# Patient Record
Sex: MALE | Race: WHITE | NOT HISPANIC OR LATINO | Employment: OTHER | URBAN - METROPOLITAN AREA
[De-identification: names, ages, dates, MRNs, and addresses within clinical notes are randomized per-mention and may not be internally consistent; named-entity substitution may affect disease eponyms.]

---

## 2023-09-28 LAB — KEPPRA: 14 UG/ML (ref 10–40)

## 2024-01-01 ENCOUNTER — ANESTHESIA EVENT (OUTPATIENT)
Dept: OPERATING ROOM | Facility: HOSPITAL | Age: 69
End: 2024-01-01
Payer: MEDICARE

## 2024-01-01 ENCOUNTER — HOSPITAL ENCOUNTER (INPATIENT)
Facility: HOSPITAL | Age: 69
LOS: 2 days | Discharge: HOSPICE | End: 2024-11-30
Attending: STUDENT IN AN ORGANIZED HEALTH CARE EDUCATION/TRAINING PROGRAM | Admitting: SURGERY
Payer: MEDICARE

## 2024-01-01 ENCOUNTER — HOSPITAL ENCOUNTER (EMERGENCY)
Facility: HOSPITAL | Age: 69
Discharge: ANOTHER HOSPITAL | End: 2024-11-28
Attending: EMERGENCY MEDICINE
Payer: OTHER GOVERNMENT

## 2024-01-01 ENCOUNTER — ANESTHESIA (OUTPATIENT)
Dept: OPERATING ROOM | Facility: HOSPITAL | Age: 69
End: 2024-01-01
Payer: MEDICARE

## 2024-01-01 ENCOUNTER — APPOINTMENT (OUTPATIENT)
Dept: RADIOLOGY | Facility: HOSPITAL | Age: 69
End: 2024-01-01
Payer: OTHER GOVERNMENT

## 2024-01-01 ENCOUNTER — HOSPITAL ENCOUNTER (INPATIENT)
Facility: HOSPITAL | Age: 69
LOS: 1 days | End: 2024-11-30
Attending: SURGERY | Admitting: SURGERY
Payer: MEDICARE

## 2024-01-01 ENCOUNTER — APPOINTMENT (OUTPATIENT)
Dept: CARDIOLOGY | Facility: HOSPITAL | Age: 69
End: 2024-01-01
Payer: OTHER GOVERNMENT

## 2024-01-01 VITALS
OXYGEN SATURATION: 88 % | HEART RATE: 115 BPM | DIASTOLIC BLOOD PRESSURE: 62 MMHG | TEMPERATURE: 97 F | SYSTOLIC BLOOD PRESSURE: 85 MMHG | RESPIRATION RATE: 8 BRPM

## 2024-01-01 VITALS
BODY MASS INDEX: 19.64 KG/M2 | SYSTOLIC BLOOD PRESSURE: 178 MMHG | RESPIRATION RATE: 16 BRPM | TEMPERATURE: 98.6 F | DIASTOLIC BLOOD PRESSURE: 108 MMHG | HEART RATE: 96 BPM | WEIGHT: 145 LBS | OXYGEN SATURATION: 96 % | HEIGHT: 72 IN

## 2024-01-01 DIAGNOSIS — A41.9 SEPSIS WITH ACUTE ORGAN DYSFUNCTION WITHOUT SEPTIC SHOCK, DUE TO UNSPECIFIED ORGANISM, UNSPECIFIED ORGAN DYSFUNCTION TYPE (MULTI): ICD-10-CM

## 2024-01-01 DIAGNOSIS — R65.20 SEPSIS WITH ACUTE ORGAN DYSFUNCTION WITHOUT SEPTIC SHOCK, DUE TO UNSPECIFIED ORGANISM, UNSPECIFIED ORGAN DYSFUNCTION TYPE (MULTI): ICD-10-CM

## 2024-01-01 DIAGNOSIS — K55.019: ICD-10-CM

## 2024-01-01 DIAGNOSIS — K55.019: Primary | ICD-10-CM

## 2024-01-01 DIAGNOSIS — K55.9 MESENTERIC ISCHEMIA (MULTI): Primary | ICD-10-CM

## 2024-01-01 DIAGNOSIS — K55.9 MESENTERIC ISCHEMIA (MULTI): ICD-10-CM

## 2024-01-01 LAB
ABO GROUP (TYPE) IN BLOOD: NORMAL
ABO GROUP (TYPE) IN BLOOD: NORMAL
ALBUMIN SERPL BCP-MCNC: 5.2 G/DL (ref 3.4–5)
ALP SERPL-CCNC: 53 U/L (ref 33–136)
ALT SERPL W P-5'-P-CCNC: 24 U/L (ref 10–52)
ANION GAP SERPL CALC-SCNC: 18 MMOL/L (ref 10–20)
AST SERPL W P-5'-P-CCNC: 46 U/L (ref 9–39)
BASOPHILS # BLD AUTO: 0.03 X10*3/UL (ref 0–0.1)
BASOPHILS NFR BLD AUTO: 0.2 %
BILIRUB SERPL-MCNC: 1.4 MG/DL (ref 0–1.2)
BUN SERPL-MCNC: 17 MG/DL (ref 6–23)
CALCIUM SERPL-MCNC: 10.2 MG/DL (ref 8.6–10.3)
CARDIAC TROPONIN I PNL SERPL HS: 13 NG/L (ref 0–20)
CHLORIDE SERPL-SCNC: 95 MMOL/L (ref 98–107)
CO2 SERPL-SCNC: 31 MMOL/L (ref 21–32)
CREAT SERPL-MCNC: 0.93 MG/DL (ref 0.5–1.3)
EGFRCR SERPLBLD CKD-EPI 2021: 89 ML/MIN/1.73M*2
EOSINOPHIL # BLD AUTO: 0.12 X10*3/UL (ref 0–0.7)
EOSINOPHIL NFR BLD AUTO: 0.6 %
ERYTHROCYTE [DISTWIDTH] IN BLOOD BY AUTOMATED COUNT: 13.2 % (ref 11.5–14.5)
GLUCOSE SERPL-MCNC: 156 MG/DL (ref 74–99)
HCT VFR BLD AUTO: 41.9 % (ref 41–52)
HGB BLD-MCNC: 14.7 G/DL (ref 13.5–17.5)
IMM GRANULOCYTES # BLD AUTO: 0.12 X10*3/UL (ref 0–0.7)
IMM GRANULOCYTES NFR BLD AUTO: 0.6 % (ref 0–0.9)
LACTATE SERPL-SCNC: 1.6 MMOL/L (ref 0.4–2)
LACTATE SERPL-SCNC: 2.6 MMOL/L (ref 0.4–2)
LIPASE SERPL-CCNC: 13 U/L (ref 9–82)
LYMPHOCYTES # BLD AUTO: 0.52 X10*3/UL (ref 1.2–4.8)
LYMPHOCYTES NFR BLD AUTO: 2.7 %
MCH RBC QN AUTO: 32.8 PG (ref 26–34)
MCHC RBC AUTO-ENTMCNC: 35.1 G/DL (ref 32–36)
MCV RBC AUTO: 94 FL (ref 80–100)
MONOCYTES # BLD AUTO: 1.69 X10*3/UL (ref 0.1–1)
MONOCYTES NFR BLD AUTO: 8.7 %
NEUTROPHILS # BLD AUTO: 16.91 X10*3/UL (ref 1.2–7.7)
NEUTROPHILS NFR BLD AUTO: 87.2 %
NRBC BLD-RTO: 0 /100 WBCS (ref 0–0)
PLATELET # BLD AUTO: 237 X10*3/UL (ref 150–450)
POTASSIUM SERPL-SCNC: 3.7 MMOL/L (ref 3.5–5.3)
PROT SERPL-MCNC: 8.4 G/DL (ref 6.4–8.2)
RBC # BLD AUTO: 4.48 X10*6/UL (ref 4.5–5.9)
RBC MORPH BLD: NORMAL
RH FACTOR (ANTIGEN D): NORMAL
RH FACTOR (ANTIGEN D): NORMAL
SODIUM SERPL-SCNC: 140 MMOL/L (ref 136–145)
WBC # BLD AUTO: 19.4 X10*3/UL (ref 4.4–11.3)

## 2024-01-01 PROCEDURE — 2500000004 HC RX 250 GENERAL PHARMACY W/ HCPCS (ALT 636 FOR OP/ED)

## 2024-01-01 PROCEDURE — 7100000002 HC RECOVERY ROOM TIME - EACH INCREMENTAL 1 MINUTE: Performed by: STUDENT IN AN ORGANIZED HEALTH CARE EDUCATION/TRAINING PROGRAM

## 2024-01-01 PROCEDURE — 7100000001 HC RECOVERY ROOM TIME - INITIAL BASE CHARGE: Performed by: STUDENT IN AN ORGANIZED HEALTH CARE EDUCATION/TRAINING PROGRAM

## 2024-01-01 PROCEDURE — 86901 BLOOD TYPING SEROLOGIC RH(D): CPT

## 2024-01-01 PROCEDURE — 2500000005 HC RX 250 GENERAL PHARMACY W/O HCPCS: Performed by: SURGERY

## 2024-01-01 PROCEDURE — 99223 1ST HOSP IP/OBS HIGH 75: CPT

## 2024-01-01 PROCEDURE — 2500000004 HC RX 250 GENERAL PHARMACY W/ HCPCS (ALT 636 FOR OP/ED): Performed by: EMERGENCY MEDICINE

## 2024-01-01 PROCEDURE — 49000 EXPLORATION OF ABDOMEN: CPT | Performed by: SURGERY

## 2024-01-01 PROCEDURE — 99223 1ST HOSP IP/OBS HIGH 75: CPT | Performed by: SURGERY

## 2024-01-01 PROCEDURE — 83690 ASSAY OF LIPASE: CPT | Performed by: EMERGENCY MEDICINE

## 2024-01-01 PROCEDURE — 3600000003 HC OR TIME - INITIAL BASE CHARGE - PROCEDURE LEVEL THREE: Performed by: STUDENT IN AN ORGANIZED HEALTH CARE EDUCATION/TRAINING PROGRAM

## 2024-01-01 PROCEDURE — 36415 COLL VENOUS BLD VENIPUNCTURE: CPT | Performed by: STUDENT IN AN ORGANIZED HEALTH CARE EDUCATION/TRAINING PROGRAM

## 2024-01-01 PROCEDURE — 2720000007 HC OR 272 NO HCPCS: Performed by: STUDENT IN AN ORGANIZED HEALTH CARE EDUCATION/TRAINING PROGRAM

## 2024-01-01 PROCEDURE — 96375 TX/PRO/DX INJ NEW DRUG ADDON: CPT | Mod: 59

## 2024-01-01 PROCEDURE — 99497 ADVNCD CARE PLAN 30 MIN: CPT

## 2024-01-01 PROCEDURE — 36600 WITHDRAWAL OF ARTERIAL BLOOD: CPT

## 2024-01-01 PROCEDURE — 99285 EMERGENCY DEPT VISIT HI MDM: CPT | Mod: 25

## 2024-01-01 PROCEDURE — 1150000001 HC HOSPICE PRIVATE ROOM DAILY

## 2024-01-01 PROCEDURE — 3700000002 HC GENERAL ANESTHESIA TIME - EACH INCREMENTAL 1 MINUTE: Performed by: STUDENT IN AN ORGANIZED HEALTH CARE EDUCATION/TRAINING PROGRAM

## 2024-01-01 PROCEDURE — 2500000004 HC RX 250 GENERAL PHARMACY W/ HCPCS (ALT 636 FOR OP/ED): Performed by: STUDENT IN AN ORGANIZED HEALTH CARE EDUCATION/TRAINING PROGRAM

## 2024-01-01 PROCEDURE — 36415 COLL VENOUS BLD VENIPUNCTURE: CPT | Performed by: EMERGENCY MEDICINE

## 2024-01-01 PROCEDURE — 83605 ASSAY OF LACTIC ACID: CPT | Performed by: STUDENT IN AN ORGANIZED HEALTH CARE EDUCATION/TRAINING PROGRAM

## 2024-01-01 PROCEDURE — 0DJD0ZZ INSPECTION OF LOWER INTESTINAL TRACT, OPEN APPROACH: ICD-10-PCS | Performed by: STUDENT IN AN ORGANIZED HEALTH CARE EDUCATION/TRAINING PROGRAM

## 2024-01-01 PROCEDURE — 74174 CTA ABD&PLVS W/CONTRAST: CPT

## 2024-01-01 PROCEDURE — 1100000001 HC PRIVATE ROOM DAILY

## 2024-01-01 PROCEDURE — 96374 THER/PROPH/DIAG INJ IV PUSH: CPT | Mod: 59

## 2024-01-01 PROCEDURE — 74174 CTA ABD&PLVS W/CONTRAST: CPT | Performed by: STUDENT IN AN ORGANIZED HEALTH CARE EDUCATION/TRAINING PROGRAM

## 2024-01-01 PROCEDURE — 84075 ASSAY ALKALINE PHOSPHATASE: CPT | Performed by: EMERGENCY MEDICINE

## 2024-01-01 PROCEDURE — 93005 ELECTROCARDIOGRAM TRACING: CPT

## 2024-01-01 PROCEDURE — 83605 ASSAY OF LACTIC ACID: CPT | Performed by: EMERGENCY MEDICINE

## 2024-01-01 PROCEDURE — 3600000008 HC OR TIME - EACH INCREMENTAL 1 MINUTE - PROCEDURE LEVEL THREE: Performed by: STUDENT IN AN ORGANIZED HEALTH CARE EDUCATION/TRAINING PROGRAM

## 2024-01-01 PROCEDURE — 84484 ASSAY OF TROPONIN QUANT: CPT | Performed by: EMERGENCY MEDICINE

## 2024-01-01 PROCEDURE — 3700000001 HC GENERAL ANESTHESIA TIME - INITIAL BASE CHARGE: Performed by: STUDENT IN AN ORGANIZED HEALTH CARE EDUCATION/TRAINING PROGRAM

## 2024-01-01 PROCEDURE — 96361 HYDRATE IV INFUSION ADD-ON: CPT

## 2024-01-01 PROCEDURE — 85025 COMPLETE CBC W/AUTO DIFF WBC: CPT | Performed by: EMERGENCY MEDICINE

## 2024-01-01 PROCEDURE — 2550000001 HC RX 255 CONTRASTS: Performed by: STUDENT IN AN ORGANIZED HEALTH CARE EDUCATION/TRAINING PROGRAM

## 2024-01-01 PROCEDURE — 87040 BLOOD CULTURE FOR BACTERIA: CPT | Mod: GEALAB | Performed by: STUDENT IN AN ORGANIZED HEALTH CARE EDUCATION/TRAINING PROGRAM

## 2024-01-01 PROCEDURE — 99291 CRITICAL CARE FIRST HOUR: CPT | Performed by: STUDENT IN AN ORGANIZED HEALTH CARE EDUCATION/TRAINING PROGRAM

## 2024-01-01 RX ORDER — LIDOCAINE HYDROCHLORIDE 10 MG/ML
INJECTION, SOLUTION INFILTRATION; PERINEURAL AS NEEDED
Status: DISCONTINUED | OUTPATIENT
Start: 2024-01-01 | End: 2024-01-01

## 2024-01-01 RX ORDER — IBUPROFEN 200 MG
1 TABLET ORAL DAILY
Status: CANCELLED | OUTPATIENT
Start: 2024-12-01 | End: 2025-01-10

## 2024-01-01 RX ORDER — NICOTINE 7MG/24HR
1 PATCH, TRANSDERMAL 24 HOURS TRANSDERMAL DAILY
Status: DISCONTINUED | OUTPATIENT
Start: 2025-01-24 | End: 2024-01-01 | Stop reason: HOSPADM

## 2024-01-01 RX ORDER — ONDANSETRON HYDROCHLORIDE 2 MG/ML
4 INJECTION, SOLUTION INTRAVENOUS ONCE AS NEEDED
Status: DISCONTINUED | OUTPATIENT
Start: 2024-01-01 | End: 2024-01-01 | Stop reason: HOSPADM

## 2024-01-01 RX ORDER — LIDOCAINE HYDROCHLORIDE 10 MG/ML
0.1 INJECTION, SOLUTION INFILTRATION; PERINEURAL ONCE
Status: DISCONTINUED | OUTPATIENT
Start: 2024-01-01 | End: 2024-01-01 | Stop reason: HOSPADM

## 2024-01-01 RX ORDER — SODIUM CHLORIDE, SODIUM LACTATE, POTASSIUM CHLORIDE, CALCIUM CHLORIDE 600; 310; 30; 20 MG/100ML; MG/100ML; MG/100ML; MG/100ML
100 INJECTION, SOLUTION INTRAVENOUS CONTINUOUS
Status: DISCONTINUED | OUTPATIENT
Start: 2024-01-01 | End: 2024-01-01 | Stop reason: HOSPADM

## 2024-01-01 RX ORDER — IBUPROFEN 200 MG
1 TABLET ORAL DAILY
Status: DISCONTINUED | OUTPATIENT
Start: 2024-12-01 | End: 2024-01-01 | Stop reason: HOSPADM

## 2024-01-01 RX ORDER — HYDROMORPHONE HYDROCHLORIDE 0.2 MG/ML
0.2 INJECTION INTRAMUSCULAR; INTRAVENOUS; SUBCUTANEOUS EVERY 2 HOUR PRN
Status: DISCONTINUED | OUTPATIENT
Start: 2024-01-01 | End: 2024-01-01

## 2024-01-01 RX ORDER — HYDROMORPHONE HYDROCHLORIDE 0.2 MG/ML
0.2 INJECTION INTRAMUSCULAR; INTRAVENOUS; SUBCUTANEOUS
Status: CANCELLED | OUTPATIENT
Start: 2024-01-01

## 2024-01-01 RX ORDER — MORPHINE SULFATE IN 0.9 % NACL 30 MG/30ML
PATIENT CONTROLLED ANALGESIA SYRINGE INTRAVENOUS CONTINUOUS
Status: DISCONTINUED | OUTPATIENT
Start: 2024-01-01 | End: 2024-01-01

## 2024-01-01 RX ORDER — GLYCOPYRROLATE 0.2 MG/ML
0.2 INJECTION INTRAMUSCULAR; INTRAVENOUS EVERY 4 HOURS PRN
Status: DISCONTINUED | OUTPATIENT
Start: 2024-01-01 | End: 2024-01-01

## 2024-01-01 RX ORDER — ONDANSETRON HYDROCHLORIDE 2 MG/ML
4 INJECTION, SOLUTION INTRAVENOUS ONCE
Status: COMPLETED | OUTPATIENT
Start: 2024-01-01 | End: 2024-01-01

## 2024-01-01 RX ORDER — NICOTINE 7MG/24HR
1 PATCH, TRANSDERMAL 24 HOURS TRANSDERMAL DAILY
Status: CANCELLED | OUTPATIENT
Start: 2025-01-24 | End: 2025-02-07

## 2024-01-01 RX ORDER — HYDROMORPHONE HCL/0.9% NACL/PF 15 MG/30ML
PATIENT CONTROLLED ANALGESIA SYRINGE INTRAVENOUS CONTINUOUS
Status: DISCONTINUED | OUTPATIENT
Start: 2024-01-01 | End: 2024-01-01

## 2024-01-01 RX ORDER — NALOXONE HYDROCHLORIDE 0.4 MG/ML
0.2 INJECTION, SOLUTION INTRAMUSCULAR; INTRAVENOUS; SUBCUTANEOUS AS NEEDED
Status: DISCONTINUED | OUTPATIENT
Start: 2024-01-01 | End: 2024-01-01

## 2024-01-01 RX ORDER — IBUPROFEN 200 MG
1 TABLET ORAL DAILY
Status: DISCONTINUED | OUTPATIENT
Start: 2025-01-10 | End: 2024-01-01 | Stop reason: HOSPADM

## 2024-01-01 RX ORDER — FENTANYL CITRATE 50 UG/ML
INJECTION, SOLUTION INTRAMUSCULAR; INTRAVENOUS AS NEEDED
Status: DISCONTINUED | OUTPATIENT
Start: 2024-01-01 | End: 2024-01-01

## 2024-01-01 RX ORDER — HEPARIN SODIUM 10000 [USP'U]/100ML
0-4500 INJECTION, SOLUTION INTRAVENOUS CONTINUOUS
Status: DISCONTINUED | OUTPATIENT
Start: 2024-01-01 | End: 2024-01-01 | Stop reason: HOSPADM

## 2024-01-01 RX ORDER — DROPERIDOL 2.5 MG/ML
0.62 INJECTION, SOLUTION INTRAMUSCULAR; INTRAVENOUS ONCE AS NEEDED
Status: DISCONTINUED | OUTPATIENT
Start: 2024-01-01 | End: 2024-01-01 | Stop reason: HOSPADM

## 2024-01-01 RX ORDER — IBUPROFEN 200 MG
1 TABLET ORAL DAILY
Status: DISCONTINUED | OUTPATIENT
Start: 2024-01-01 | End: 2024-01-01 | Stop reason: HOSPADM

## 2024-01-01 RX ORDER — HYDROMORPHONE HYDROCHLORIDE 0.2 MG/ML
0.2 INJECTION INTRAMUSCULAR; INTRAVENOUS; SUBCUTANEOUS
Status: DISCONTINUED | OUTPATIENT
Start: 2024-01-01 | End: 2024-01-01 | Stop reason: HOSPADM

## 2024-01-01 RX ORDER — OXYCODONE HYDROCHLORIDE 5 MG/1
5 TABLET ORAL EVERY 4 HOURS PRN
Status: DISCONTINUED | OUTPATIENT
Start: 2024-01-01 | End: 2024-01-01 | Stop reason: HOSPADM

## 2024-01-01 RX ORDER — LORAZEPAM 2 MG/ML
1 INJECTION INTRAMUSCULAR EVERY 4 HOURS PRN
Status: DISCONTINUED | OUTPATIENT
Start: 2024-01-01 | End: 2024-01-01 | Stop reason: HOSPADM

## 2024-01-01 RX ORDER — ROCURONIUM BROMIDE 10 MG/ML
INJECTION, SOLUTION INTRAVENOUS AS NEEDED
Status: DISCONTINUED | OUTPATIENT
Start: 2024-01-01 | End: 2024-01-01

## 2024-01-01 RX ORDER — MIDAZOLAM HYDROCHLORIDE 1 MG/ML
INJECTION INTRAMUSCULAR; INTRAVENOUS AS NEEDED
Status: DISCONTINUED | OUTPATIENT
Start: 2024-01-01 | End: 2024-01-01

## 2024-01-01 RX ORDER — GLYCOPYRROLATE 0.2 MG/ML
0.4 INJECTION INTRAMUSCULAR; INTRAVENOUS EVERY 6 HOURS PRN
Status: DISCONTINUED | OUTPATIENT
Start: 2024-01-01 | End: 2024-01-01 | Stop reason: HOSPADM

## 2024-01-01 RX ORDER — MORPHINE SULFATE 4 MG/ML
4 INJECTION INTRAVENOUS ONCE
Status: COMPLETED | OUTPATIENT
Start: 2024-01-01 | End: 2024-01-01

## 2024-01-01 RX ORDER — HYDROMORPHONE HYDROCHLORIDE 1 MG/ML
0.2 INJECTION, SOLUTION INTRAMUSCULAR; INTRAVENOUS; SUBCUTANEOUS EVERY 4 HOURS PRN
Status: DISCONTINUED | OUTPATIENT
Start: 2024-01-01 | End: 2024-01-01 | Stop reason: HOSPADM

## 2024-01-01 RX ORDER — OXYCODONE HYDROCHLORIDE 5 MG/1
10 TABLET ORAL EVERY 4 HOURS PRN
Status: DISCONTINUED | OUTPATIENT
Start: 2024-01-01 | End: 2024-01-01 | Stop reason: HOSPADM

## 2024-01-01 RX ORDER — HYDROMORPHONE HCL/0.9% NACL/PF 15 MG/30ML
PATIENT CONTROLLED ANALGESIA SYRINGE INTRAVENOUS CONTINUOUS
Status: DISCONTINUED | OUTPATIENT
Start: 2024-01-01 | End: 2024-01-01 | Stop reason: HOSPADM

## 2024-01-01 RX ORDER — ONDANSETRON HYDROCHLORIDE 2 MG/ML
INJECTION, SOLUTION INTRAVENOUS AS NEEDED
Status: DISCONTINUED | OUTPATIENT
Start: 2024-01-01 | End: 2024-01-01

## 2024-01-01 RX ORDER — PROPOFOL 10 MG/ML
INJECTION, EMULSION INTRAVENOUS AS NEEDED
Status: DISCONTINUED | OUTPATIENT
Start: 2024-01-01 | End: 2024-01-01

## 2024-01-01 RX ORDER — HYDROMORPHONE HYDROCHLORIDE 0.2 MG/ML
0.2 INJECTION INTRAMUSCULAR; INTRAVENOUS; SUBCUTANEOUS EVERY 5 MIN PRN
Status: DISCONTINUED | OUTPATIENT
Start: 2024-01-01 | End: 2024-01-01 | Stop reason: HOSPADM

## 2024-01-01 RX ORDER — GLYCOPYRROLATE 0.2 MG/ML
0.4 INJECTION INTRAMUSCULAR; INTRAVENOUS EVERY 6 HOURS PRN
Status: CANCELLED | OUTPATIENT
Start: 2024-01-01

## 2024-01-01 RX ORDER — HYDROMORPHONE HCL/0.9% NACL/PF 15 MG/30ML
PATIENT CONTROLLED ANALGESIA SYRINGE INTRAVENOUS CONTINUOUS
Status: CANCELLED | OUTPATIENT
Start: 2024-01-01

## 2024-01-01 RX ORDER — IBUPROFEN 200 MG
1 TABLET ORAL DAILY
Status: CANCELLED | OUTPATIENT
Start: 2025-01-10 | End: 2025-01-24

## 2024-01-01 RX ORDER — LORAZEPAM 2 MG/ML
1 INJECTION INTRAMUSCULAR EVERY 4 HOURS PRN
Status: CANCELLED | OUTPATIENT
Start: 2024-01-01

## 2024-01-01 RX ORDER — SODIUM CHLORIDE 0.9 G/100ML
IRRIGANT IRRIGATION AS NEEDED
Status: DISCONTINUED | OUTPATIENT
Start: 2024-01-01 | End: 2024-01-01 | Stop reason: HOSPADM

## 2024-01-01 RX ORDER — LORAZEPAM 2 MG/ML
0.5 INJECTION INTRAMUSCULAR EVERY 4 HOURS PRN
Status: DISCONTINUED | OUTPATIENT
Start: 2024-01-01 | End: 2024-01-01

## 2024-01-01 SDOH — HEALTH STABILITY: MENTAL HEALTH: CURRENT SMOKER: 1

## 2024-01-01 ASSESSMENT — PAIN SCALES - GENERAL
PAINLEVEL_OUTOF10: 10 - WORST POSSIBLE PAIN
PAINLEVEL_OUTOF10: 0 - NO PAIN
PAINLEVEL_OUTOF10: 6
PAINLEVEL_OUTOF10: 7
PAINLEVEL_OUTOF10: 5 - MODERATE PAIN
PAINLEVEL_OUTOF10: 5 - MODERATE PAIN
PAINLEVEL_OUTOF10: 7
PAINLEVEL_OUTOF10: 8
PAINLEVEL_OUTOF10: 7
PAIN_LEVEL: 3
PAINLEVEL_OUTOF10: 5 - MODERATE PAIN

## 2024-01-01 ASSESSMENT — COGNITIVE AND FUNCTIONAL STATUS - GENERAL
WALKING IN HOSPITAL ROOM: TOTAL
MOVING TO AND FROM BED TO CHAIR: TOTAL
PERSONAL GROOMING: TOTAL
HELP NEEDED FOR BATHING: TOTAL
WALKING IN HOSPITAL ROOM: TOTAL
MOVING FROM LYING ON BACK TO SITTING ON SIDE OF FLAT BED WITH BEDRAILS: TOTAL
MOVING FROM LYING ON BACK TO SITTING ON SIDE OF FLAT BED WITH BEDRAILS: TOTAL
CLIMB 3 TO 5 STEPS WITH RAILING: TOTAL
CLIMB 3 TO 5 STEPS WITH RAILING: TOTAL
TURNING FROM BACK TO SIDE WHILE IN FLAT BAD: TOTAL
DRESSING REGULAR LOWER BODY CLOTHING: TOTAL
WALKING IN HOSPITAL ROOM: TOTAL
DAILY ACTIVITIY SCORE: 6
TOILETING: TOTAL
PERSONAL GROOMING: TOTAL
HELP NEEDED FOR BATHING: TOTAL
PERSONAL GROOMING: TOTAL
MOVING FROM LYING ON BACK TO SITTING ON SIDE OF FLAT BED WITH BEDRAILS: TOTAL
MOBILITY SCORE: 6
MOBILITY SCORE: 24
DRESSING REGULAR UPPER BODY CLOTHING: TOTAL
MOBILITY SCORE: 6
CLIMB 3 TO 5 STEPS WITH RAILING: TOTAL
TURNING FROM BACK TO SIDE WHILE IN FLAT BAD: TOTAL
MOVING TO AND FROM BED TO CHAIR: TOTAL
STANDING UP FROM CHAIR USING ARMS: TOTAL
DRESSING REGULAR UPPER BODY CLOTHING: TOTAL
DAILY ACTIVITIY SCORE: 24
TOILETING: TOTAL
DRESSING REGULAR UPPER BODY CLOTHING: TOTAL
STANDING UP FROM CHAIR USING ARMS: TOTAL
HELP NEEDED FOR BATHING: TOTAL
MOBILITY SCORE: 6
EATING MEALS: TOTAL
STANDING UP FROM CHAIR USING ARMS: TOTAL
DRESSING REGULAR LOWER BODY CLOTHING: TOTAL
TURNING FROM BACK TO SIDE WHILE IN FLAT BAD: TOTAL
DAILY ACTIVITIY SCORE: 6
MOVING TO AND FROM BED TO CHAIR: TOTAL
EATING MEALS: TOTAL
DRESSING REGULAR LOWER BODY CLOTHING: TOTAL
EATING MEALS: TOTAL

## 2024-01-01 ASSESSMENT — PAIN DESCRIPTION - LOCATION
LOCATION: ABDOMEN

## 2024-01-01 ASSESSMENT — PAIN SCALES - WONG BAKER
WONGBAKER_NUMERICALRESPONSE: NO HURT
WONGBAKER_NUMERICALRESPONSE: NO HURT

## 2024-01-01 ASSESSMENT — PAIN - FUNCTIONAL ASSESSMENT
PAIN_FUNCTIONAL_ASSESSMENT: 0-10

## 2024-01-01 ASSESSMENT — PAIN SCALES - PAIN ASSESSMENT IN ADVANCED DEMENTIA (PAINAD)
TOTALSCORE: 1
BREATHING: OCCASIONAL LABORED BREATHING, SHORT PERIOD OF HYPERVENTILATION
NEGVOCALIZATION: OCCASIONAL MOAN/GROAN, LOW SPEECH, NEGATIVE/DISAPPROVING QUALITY
BREATHING: OCCASIONAL LABORED BREATHING, SHORT PERIOD OF HYPERVENTILATION
TOTALSCORE: 5
BODYLANGUAGE: RELAXED
BODYLANGUAGE: TENSE, DISTRESSED PACING, FIDGETING
CONSOLABILITY: NO NEED TO CONSOLE
CONSOLABILITY: NO NEED TO CONSOLE
FACIALEXPRESSION: SMILING OR INEXPRESSIVE
FACIALEXPRESSION: FACIAL GRIMACING

## 2024-01-01 ASSESSMENT — LIFESTYLE VARIABLES
HAVE YOU EVER FELT YOU SHOULD CUT DOWN ON YOUR DRINKING: NO
EVER HAD A DRINK FIRST THING IN THE MORNING TO STEADY YOUR NERVES TO GET RID OF A HANGOVER: NO
TOTAL SCORE: 0
HAVE PEOPLE ANNOYED YOU BY CRITICIZING YOUR DRINKING: NO
EVER FELT BAD OR GUILTY ABOUT YOUR DRINKING: NO

## 2024-01-01 ASSESSMENT — COLUMBIA-SUICIDE SEVERITY RATING SCALE - C-SSRS
1. IN THE PAST MONTH, HAVE YOU WISHED YOU WERE DEAD OR WISHED YOU COULD GO TO SLEEP AND NOT WAKE UP?: NO
6. HAVE YOU EVER DONE ANYTHING, STARTED TO DO ANYTHING, OR PREPARED TO DO ANYTHING TO END YOUR LIFE?: NO
2. HAVE YOU ACTUALLY HAD ANY THOUGHTS OF KILLING YOURSELF?: NO

## 2024-01-01 ASSESSMENT — PAIN DESCRIPTION - ORIENTATION: ORIENTATION: LEFT;RIGHT;LOWER

## 2024-07-07 DIAGNOSIS — G40.209 LOCALIZATION-RELATED (FOCAL) (PARTIAL) SYMPTOMATIC EPILEPSY AND EPILEPTIC SYNDROMES WITH COMPLEX PARTIAL SEIZURES, NOT INTRACTABLE, WITHOUT STATUS EPILEPTICUS (MULTI): ICD-10-CM

## 2024-07-09 RX ORDER — LEVETIRACETAM 500 MG/1
500 TABLET ORAL 2 TIMES DAILY
Qty: 180 TABLET | Refills: 3 | Status: SHIPPED | OUTPATIENT
Start: 2024-07-09

## 2024-11-28 PROBLEM — J44.9 CHRONIC OBSTRUCTIVE PULMONARY DISEASE (MULTI): Status: ACTIVE | Noted: 2024-01-01

## 2024-11-28 PROBLEM — I10 PRIMARY HYPERTENSION: Status: ACTIVE | Noted: 2024-01-01

## 2024-11-28 PROBLEM — K55.9 MESENTERIC ISCHEMIA (MULTI): Status: ACTIVE | Noted: 2024-01-01

## 2024-11-28 PROBLEM — K55.019: Status: ACTIVE | Noted: 2024-01-01

## 2024-11-28 NOTE — ED TRIAGE NOTES
Patient here for abdominal pain Since 22:00 yesterday has had RLQ and LLQ pain. Endorses nausea,vomiting, diarrhea. Pain radiates to the back

## 2024-11-28 NOTE — ED PROVIDER NOTES
HPI   Chief Complaint   Patient presents with    Abdominal Pain     Since 22:00 yesterday has had RLQ and LLQ pain. Endorses nausea,vomiting, diarrhea. Pain radiates to the back        Andrea is a 69-year-old who presents with abdominal pain.  On he reports this started low down in his abdomen and was both sides.  It is a constant discomfort with some diarrhea yesterday.  Finds it little difficult to urinate as well he tried some Tums that did not help.  He notes is a sharp constant pain with vomiting 5 times yesterday and a couple times today.  He could not sleep at all last night.  He denies any recent trauma or falls.  Patient has had previous abdominal surgery for intestinal clot.  He denies any cough cold symptoms or fevers.  He is a little nauseous right now.  No pain with urination nothing seems to make it feel better.  He does not have a comfortable position.  He is not colicky in nature.  He smokes half pack a day tobacco.  History comes from patient and his wife and EMR.  His mother-in-law was also in the room during exam.              Patient History   History reviewed. No pertinent past medical history.  History reviewed. No pertinent surgical history.  No family history on file.  Social History     Tobacco Use    Smoking status: Every Day     Types: Cigarettes    Smokeless tobacco: Never   Substance Use Topics    Alcohol use: Yes    Drug use: Yes     Types: Marijuana       Physical Exam   ED Triage Vitals [11/28/24 1637]   Temperature Heart Rate Respirations BP   37 °C (98.6 °F) 100 18 (!) 193/103      Pulse Ox Temp Source Heart Rate Source Patient Position   98 % Skin Monitor Lying      BP Location FiO2 (%)     Right arm --       Physical Exam  Vitals reviewed.   Constitutional:       General: He is awake.      Comments: Patient appears uncomfortable during the exam is sitting up in bed hunched over holding his lower abdomen   HENT:      Head: Normocephalic.      Nose: Nose normal.   Cardiovascular:       Rate and Rhythm: Normal rate and regular rhythm.   Pulmonary:      Effort: Pulmonary effort is normal.      Breath sounds: Normal breath sounds.   Abdominal:      Comments: Some voluntary guarding noted.  Patient is on my exam.  He is tender mostly lower abdomen thin bilaterally with some hyperactive bowel sounds   Musculoskeletal:      Cervical back: Normal range of motion.   Skin:     General: Skin is warm.      Capillary Refill: Capillary refill takes less than 2 seconds.   Neurological:      Mental Status: He is alert.           ED Course & MDM   ED Course as of 11/29/24 0620   Thu Nov 28, 2024   1715 Patient was seen the emergency room for abdominal pains going on since yesterday.  Patient be worked up considered differential diagnosis of perforation obstruction diverticulitis kidney stone pancreatitis vascular problems.  Plan is to do a CT scan with contrast and labs.  Give him some morphine and Zofran and reevaluate.  Will also give 1 L normal saline.  On my exam I also consider gallbladder but do not believe an ultrasound is indicated is not tender right upper quadrant.  He has had previous abdominal surgeries for mesenteric clot. [RZ]   1727 EKG done at 1719 interpreted by me shows normal sinus rhythm at 93 bpm with no obvious ischemia there is some significant background noise.  This is similar to old EKG January 5, 2022.  This is different than the interpretation done by the computer which suggest A-fib.  I believe this is more artifact than true A-fib. [RZ]   1743 We are awaiting CT and labs patient is endorsed oncoming physician Dr. Maciel at 1800 [RZ]   1855 Received a call from radiology, patient with multiple critical findings, portal venous gas, bowel wall edema, bowel distention, hemorrhagic free fluid, and SMA graft occlusion concerning for mesenteric ischemia.  Discussed patient with general surgeon at [facility], Dr. Husain he states this requires vascular surgery and needs to be  transferred.  Updated patient.  He is hemodynamically stable, but has significant abdominal tenderness.  Will redose pain medicine.  Was tachycardic arrival here with elevated white blood cell count, will initiate sepsis protocol at this time as this is sepsis time 0. [SH]   1915 Discussed patient with vascular surgery via transfer center.  She will accept the patient directly to the OR at [facility] for emergent surgery.  She requested IV antibiotics which were already initiated.  IV heparin drip.  Will trend his lactate.  She asked that we informed the patient of severe high risk for morbidity mortality from the surgery even if done in a timely fashion.  Patient will be made aware.  EMTALA form completed.  Will have patient sign.  Patient to be transferred via CCT either air if lying or ground if CCTA is not flying. [SH]   1925 Patient discussed with transfer center again, CCT air is not flying, CCT ground will be a significant delay.  ALS is able to be here very momentarily.  I considered the risk first benefit of ALS transfer versus CCT ground transfer and feel that the benefits of more expeditious time to the operating room are outweighed by the risks of a lower level of care during transport given the patient is currently hemodynamically stable.  Informed patient of this.  Patient agrees.  Patient will be transferred directly via ALS at this time. [SH]      ED Course User Index  [RZ] Cipriano Palma MD  [SH] Cheikh Maciel MD         Diagnoses as of 11/29/24 0620   Mesenteric ischemia (Multi)   Sepsis with acute organ dysfunction without septic shock, due to unspecified organism, unspecified organ dysfunction type (Multi)                 No data recorded     Gurinder Coma Scale Score: 15 (11/28/24 1638 : Dov Jean RN)                           Medical Decision Making      Procedure  Procedures     Cipriano Palma MD  11/28/24 1745       Cipriano Palma MD  11/29/24 0620

## 2024-11-29 NOTE — PROGRESS NOTES
Spiritual Care Visit    Clinical Encounter Type  Visited With: Patient and family together  Routine Visit: Introduction  Continue Visiting: Yes  Crisis Visit: Critical care  Referral From: Nurse  Referral To:     Yarsani Encounters  Yarsani Needs: Prayer         Sacramental Encounters  Sacrament of Sick-Anointing: Anointed    Patient received the Sacrament of the Anointing of the Sick by Fr. Francisco Gonzalez,  Mormon .  Family members were preset.      Within the [employer] the Sacrament of the Sick, also known as the Anointing of the Sick, is a prayer in which we invoke the healing power of God.  Although considered part of 'Last Rites' the Anointing of the Sick, along with Eucharist and Reconciliation, is a repeatable sacrament and should be received by anyone about to undergo surgery, those in recovery and the elderly.  Those who are actively dying should receive the Anointing of the Sick for physical and spiritual healing.

## 2024-11-29 NOTE — SIGNIFICANT EVENT
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 S/p exploratory laparotomy with diffuse necrotic bowel from 30 cm distal to Treitz through sigmoid.   Condition is not surviveable and patient will be transitioned to comfort measures.  Abdomen is closed and patient will be extubated.   Family is currently on their way in to see patient.    Aubrey Tyson MD  Vascular Surgery Fellow  Service Pager: [phone]

## 2024-11-29 NOTE — PROGRESS NOTES
Andrea Abdi is a 69 y.o. male on day 1 of admission presenting with Mesenteric ischemia (Multi).  [facility] called for Hospice consult. SW spoke with Wife, Karishma and provided information about Hospice program and benefit to her and pt. Wife expressed he isn't leaving the hospital. SW confirmed she had supports with her at this time.  She stated her sister and her daughter are here. She was comforted that the  was here to visit.  She is agreeable to Hospice consult and meeting.  SW explained that they will call her to meet and sign consents.  She was provided contact number to call for any concerns.  SW made referral to [facility]. SW continues to follow for support.   4:07 pm -[facility] have meeting scheduled for tomorrow 11/30 at bedside at 8:30-9am.  Was contacted to inquire if pt has [facility] benefits to cover hospitalization.  SW left a message for FRANSISCO Nazario at [facility] inquiring about pt benefits.  [facility] will follow.   FRANSISCO CEDILLO

## 2024-11-29 NOTE — BRIEF OP NOTE
Date: 2024  OR Location: [facility] OR    Name: Andrea Abdi, : 1955, Age: 69 y.o., MRN: [id number], Sex: male    Diagnosis  Pre-op Diagnosis      * Mesenteric ischemia (Multi) [K55.9] Post-op Diagnosis     * Mesenteric ischemia (Multi) [K55.9]     Procedures  Exploratory laparotomy    Surgeons   Panel 1:     * Kaitlyn M Dunphy - Primary  Panel 2:     * Arcadio Cabral - Mateusz    Resident/Fellow/Other Assistant:  Surgeons and Role:  Panel 1:     * Aubrey Tyson MD - Resident - Assisting  Panel 2:     * Ramesh Maria MD - Resident - Assisting     * Gayatri Villalobos MD - Resident - Assisting    Staff:   Circulator: Savanna Corrales Person: Fabricio    Anesthesia Staff: Anesthesiologist: Fabricio Ureña MD  Anesthesia Resident: Roldan Miller MD    Procedure Summary  Anesthesia: Anesthesia type not filed in the log.  ASA: ASA status not filed in the log.  Estimated Blood Loss: 5 mL  Intra-op Medications:   Administrations occurring from 24 1655 to 24 0300:   Medication Name Total Dose   fentaNYL PF 0.05 mg/mL 100 mcg   lidocaine (Xylocaine) injection 1 % 100 mg   midazolam PF (Versed) injection 1 mg/mL 2 mg   propofol (Diprivan) infusion 10 mg/mL 80 mg   rocuronium (ZeMuron) 50 mg/5 mL injection 100 mg              Anesthesia Record               Intraprocedure I/O Totals          Intake    Propofol Drip 0.00 mL    The total shown is the total volume documented since Anesthesia Start was filed.    Total Intake 0 mL          Specimen: No specimens collected     Findings: Small bowel is diffusely necrotic from 30 cm distal to Treitz, large bowel necrotic from cecum through sigmoid colon, rectum with patchy ischemia, murky fluid in abdomen    Complications:  None; patient tolerated the procedure well.     Disposition: PACU - hemodynamically stable.  Condition: stable  Specimens Collected: No specimens collected  Attending Attestation:     Kaitlyn M Dunphy - Mateusz Oro  Primary

## 2024-11-29 NOTE — CONSULTS
Inpatient consult to Palliative Care  Consult performed by: Nova Pedersen, APRN-CNP  Consult ordered by: Arcadio Cabral MD          Palliative Medicine Consult  Complex medical decision making, symptom management, patient/family support    History obtained from chart review including ED note, H&P, patient's daily progress notes, review of lab/test results, and discussion with primary team and bedside RN.    Subjective    History of Present Illness  Mr. Andrea Abdi is a 69-year-old gentleman with a past medical history of alcohol use disorder, tobacco use disorder, hypertension, and acute mesenteric ischemia in the setting of thrombosed SMA bypass graft.  Diffuse bowel necrosis was found during exploratory laparotomy, indicating very poor prognosis.    Introduction to Palliative Medicine  Met with patient, wife, and multiple family members at bedside.   Patient remains altered, does not have capacity to make their own medical decisions at this time. Unable to participate in ROS or goals of care discussion. Surrogate decision maker is wife Evonne.    Palliative Medicine was introduced as a specialty service for patients with serious illness to help with symptom management, improve quality of life, assist with goals of care conversations, navigate complex decision making, and provide support to patients and families. Support and empathy was provided throughout the encounter. Provided reflective listening and presence.     Symptoms  Patient appears at end-of-life, ROS limited, drowsy during assessment    Palliative Medicine Social History:  Patient lives at home with his wife Evonne.  Patient does not have children.    Objective    Last Recorded Vitals  /74 (BP Location: Left arm, Patient Position: Lying)   Pulse (!) 113   Temp 36.7 °C (98.1 °F) (Temporal)   Resp 20   SpO2 90%      Physical Exam  Constitutional:       Appearance: He is ill-appearing.   HENT:      Head: Normocephalic.      Mouth/Throat:       Mouth: Mucous membranes are dry.   Pulmonary:      Effort: Pulmonary effort is normal.   Skin:     General: Skin is dry.   Neurological:      Mental Status: He is disoriented.          Relevant Results  Results for orders placed or performed during the hospital encounter of 11/28/24 (from the past 24 hours)   VERIFY ABO/Rh Group Test   Result Value Ref Range    ABO TYPE A     Rh TYPE POS    ABO/Rh   Result Value Ref Range    ABO TYPE A     Rh TYPE POS       CT angio abdomen pelvis w and or wo IV IV contrast  Narrative: Interpreted By:  Joel Palacios,   STUDY:  CT ANGIO ABDOMEN PELVIS W AND/OR WO IV IV CONTRAST;  11/28/2024 6:28  pm      INDICATION:  Signs/Symptoms:abd pain.          COMPARISON:  None.      ACCESSION NUMBER(S):  [id number]      ORDERING CLINICIAN:  RUPA MCCANN      TECHNIQUE:  CT of the abdomen and pelvis was obtained before and after  administration of intravenous contrast in the arterial and delayed  venous phase as part of CT angiography protocol. Coronal sagittal  reformats were obtained. 3D volume rendering of the aorta and its  branch vessels was obtained on a separate workstation. 90 ML of  Omnipaque 350 was administered intravenously without immediate  complication.      FINDINGS:  LOWER CHEST:  There are peribronchial ground-glass opacities in the right middle  lobe suggestive of mucous plugging or infectious/inflammatory  bronchiolitis. Lung bases are otherwise clear.      ABDOMEN:      LIVER:  Normal size and enhancement.      BILE DUCTS:  No biliary dilatation.      GALLBLADDER:  No radiopaque calculi. No gallbladder wall thickening or distention.      PANCREAS:  Normal size and enhancement. No peripancreatic edema.      SPLEEN:  Normal.      ADRENAL GLANDS:  Normal.      KIDNEYS AND URETERS:  Normal size and enhancement of the kidneys. No hydronephrosis or  nephrolithiasis.      PELVIS:      BLADDER:  Grossly normal.      REPRODUCTIVE ORGANS:  No pelvic mass visualized.       BOWEL:  There are multiple dilated loops of small bowel without a clear  transition point. There is pneumatosis within the small bowel in the  left upper quadrant. There is also mesenteric venous gas adjacent to  the small bowel, for example axial image 215 of 455.      VESSELS:  Abdominal aorta is normal in size and patent without dissection.  Moderate circumferential atherosclerotic plaque of the abdominal  aorta. Celiac artery is patent. There is chronic appearing occlusion  of the proximal superior mesenteric artery. There is a stent graft  from the left common iliac artery to the proximal superior mesenteric  artery which is also occluded. This is age-indeterminate. Inferior  mesenteric artery is diminutive but patent. Mild atherosclerotic  narrowing of the bilateral renal artery origins which are patent.  Common iliac, external iliac, and common femoral arteries are widely  patent. There is age-indeterminate occlusion of the bilateral  superficial femoral arteries from the origin.      Portal, splenic, and superior mesenteric veins are patent.      PERITONEUM/RETROPERITONEUM/LYMPH NODES:  No free air. There is however portal venous gas. There is also small  amount of hemorrhagic fluid in the left upper quadrant posterior to  the abnormal small bowel, with increased density of the hemorrhagic  fluid on the venous phase, for example axial image 141 of 455.. There  is otherwise trace amount of ascitic fluid in the right upper  paracolic gutter.      BONES AND ABDOMINAL WALL:  Vertebral bodies are intact. No focal suspicious lesions. Osteopenia.      Impression: 1.  Findings highly concerning for acute bowel ischemia. Multiple  dilated loops of small bowel in the left upper quadrant and left  lower quadrant with areas of pneumatosis noted, likely due to  ischemic ileus. No clear evidence of bowel obstruction. Small amount  of hemorrhagic fluid in the left upper paracolic gutter posterior to  these abnormal loops  of small bowel. Focal mesenteric venous gas in  the left upper quadrant adjacent to this abnormal small bowel, for  example coronal image 63 and axial image 210. Additionally diffuse  liver portal venous gas.  2. Age indeterminate occlusion of the proximal superior mesenteric  artery. There is also occluded bypass stent graft from left common  iliac to the proximal superior mesenteric artery which may represent  acute thrombus. Additionally age-indeterminate bilateral superficial  femoral artery occlusions from the origin. General and vascular  surgery  consultation is recommended.      Joel Palacios discussed the significance and urgency of this critical  finding by telephone with  Dr Maciel on 11/28/2024 at 6:49 pm.  (**-RCF-**) Findings:  See findings.      MACRO:  None      Signed by: Joel Palacios 11/28/2024 6:58 PM  Dictation workstation:   [id number]     No results found for this or any previous visit (from the past 4464 hours).     Allergies  Patient has no known allergies.    Scheduled medications  nicotine, 1 patch, transdermal, Daily   Followed by  [START ON 1/10/2025] nicotine, 1 patch, transdermal, Daily   Followed by  [START ON 1/24/2025] nicotine, 1 patch, transdermal, Daily      Continuous medications  HYDROmorphone,       PRN medications  PRN medications: glycopyrrolate, LORazepam, [Held by provider] naloxone     Assessment/Plan    Mr. Andrea Abdi is a 69-year-old gentleman with a past medical history of alcohol use disorder, tobacco use disorder, hypertension, and acute mesenteric ischemia in the setting of thrombosed SMA bypass graft.  Diffuse bowel necrosis was found during exploratory laparotomy, indicating very poor prognosis.    ----------------------------------------------------------------------------------------------------------------------------------------------------------------  Advanced Care Planning  Patient and/or family consented to a voluntary Advanced Care Planning meeting.    Serious Illness Assessment and Counseling:  Life Limiting Disease:   Bowel necrosis posing threat to life or function.     Disease Specific Information Provided/Prognosis Discussed: Patient's current clinical condition, including diagnosis, prognosis, and management plan were discussed.   Counseling provided on poor prognosis   Counseling provided on the irreversible and progressive nature of patient's diseases including mesenteric ischemia and bowel necrosis     Understanding/Overall Impression: Family expressing clear understanding of overall health status and severity of illness.     Goals/Hopes: Discussion ensued about transition to comfort based plan of care that focuses on symptom management and quality of life.     Fears/Worries/Concerns: Family member in the room was very concerned that patient had inadequate pain control with morphine PCA and requested Dilaudid PCA which was running at time of my visit    Hospice Discussion/Eligibility: Counseling provided on the benefit of Hospice Services in the setting of patient's bowel necrosis to keep patient out of the hospital while supporting patient and family by providing counseling, aggressive symptom management,  prioritizing comfort and quality of life, alleviation of suffering, and allowing patient to pass with comfort and dignity. Patient is hospice-eligible.   Patient/Family Impression: Wife is agreeable to hospice referral with the hope that hospice can provide support to patient and family in the inpatient setting  All questions and concerns were addressed during encounter.     I spent 30 minutes in providing separately identifiable ACP services with the patient and/or surrogate decision maker in a voluntary conversation discussing the patient's wishes and goals as detailed in the above note.    ----------------------------------------------------------------------------------------------------------------------------------------------------------------    #Complex Medical Decision Making  #Goals of Care  #Advanced Care Planning  - Code status: DNRCC  - Surrogate decision maker: Chuck Douglass  - Goals are comfort and quality of life based: consult placed to hospice services   -Hospice referral placed by Jocelin PITTS. Hospice [facility] will contact family within 24 hours to discuss transition of care.  My assessment is that due to patient's current clinical state, likely will qualify for GIP (general inpatient here at the [facility]) hospice.     # Comfort measures  -Change Dilaudid PCA to 0.5mg/hr basal, no demand.  Can increase the basal dose or add breakthrough doses from the pump as needed.  This is helpful for both pain and dyspnea management  -Start lorazepam 1mg IV q4 PRN for anxiety and restlessness.  Can increase dose as needed based on clinical presentation  -Start Robinul 0.4mg IV q6 PRN for secretion management  - Can have rectal acetaminophen available as needed for  fevers if not taking orals  - Daily vital signs as needed.  Consider taking vital signs as needed at night to limit family distress.  - CODE STATUS DNRCC  - Comfort feeds as tolerated, liberalize diet  - Discontinue treatment focused interventions such as testing and labs  - Discontinue cardiac monitoring    #Psychosocial Support  - Music Therapy and art therapy ordered for bereavement and end-of-life support  - Spiritual Care Support patient is Gnosticist and was seen by       Plan of Care discussed with: Updated Dr. Mack and bedside RN on goals of care decision, medication adjustments, and code status     Medical Decision Making was high level due to high complexity of problems, extensive data review, and high risk of management/treatment.     -Bowel ischemia posing threat to life and function   - Decision not to  resuscitate or to de-escalate care because of poor prognosis: Decision made to pursue hospice today  - Parenteral controlled substances: Dilaudid PCA    Thank you for allowing us to participate in the care of this patient. Palliative will continue to follow as needed. Palliative medicine is available Monday-Friday, 8a-6p. Please contact team with any questions or concerns.  Team pager [phone] (weekdays)  Nova Pedersen DNP, APRN-CNP

## 2024-11-29 NOTE — ANESTHESIA PREPROCEDURE EVALUATION
Patient: Andrea Abdi    Procedure Information       Anesthesia Start Date/Time: 11/28/24 2056    Procedures:       Creation Bypass Graft Ilio Mesenteric Artery      Exploration Laparotomy (Abdomen) - Possible bowel resection    Location: [facility] OR 27 / [facility] OR    Surgeons: Kaitlyn M Dunphy, MD; Arcadio Cabral MD            Relevant Problems   Anesthesia (within normal limits)      Cardiac   (+) Primary hypertension      Pulmonary   (+) Chronic obstructive pulmonary disease (Multi)      Neuro (within normal limits)      /Renal (within normal limits)      Hematology   (+) Acute ischemia of small intestine due to thrombosis of mesenteric vein (Multi)       Clinical information reviewed:                   NPO Detail:  No data recorded     Physical Exam    Airway  Mallampati: III  TM distance: >3 FB  Neck ROM: full     Cardiovascular    Dental        Pulmonary    Abdominal            Anesthesia Plan    History of general anesthesia?: yes  History of complications of general anesthesia?: no    ASA 4 - emergent     general     The patient is a current smoker.  Patient did not smoke on day of procedure.    intravenous induction   Postoperative administration of opioids is intended.  Trial extubation is planned.  Anesthetic plan and risks discussed with patient.  Use of blood products discussed with patient who consented to blood products.    Plan discussed with attending.

## 2024-11-29 NOTE — ANESTHESIA PROCEDURE NOTES
Arterial Line:    Date/Time: 11/28/2024 9:46 PM    Staffing  Performed: resident   Authorized by: Fabricio Ureña MD    Performed by: Roldan Miller MD    An arterial line was placed. Procedure performed using surface landmarks.in the OR for the following indication(s): continuous blood pressure monitoring and blood sampling needed.    A 20 gauge (size), 1 and 3/4 inch (length), Angiocath (type) catheter was placed into the Left radial artery, secured by Tegaderm,   Seldinger technique used.  Events:  patient tolerated procedure well with no complications.

## 2024-11-29 NOTE — PROGRESS NOTES
Emergency Department Transition of Care Note       Signout   I received Andrea Abdi in signout from Dr. Palma.  Please see the ED Provider Note for all HPI, PE and MDM up to the time of signout at 1800.  This is in addition to the primary record.    In brief Andrea Abdi is an 69 y.o. male presenting for Abd Pain    At the time of signout we were awaiting:  CT abd pelvis    ED Course & Medical Decision Making   Medical Decision Making:  See ED course for updates during the patient's stay in the ED under my care.    ED Course:  ED Course as of 11/28/24 1926   Thu Nov 28, 2024 1715 Patient was seen the emergency room for abdominal pains going on since yesterday.  Patient be worked up considered differential diagnosis of perforation obstruction diverticulitis kidney stone pancreatitis vascular problems.  Plan is to do a CT scan with contrast and labs.  Give him some morphine and Zofran and reevaluate.  Will also give 1 L normal saline.  On my exam I also consider gallbladder but do not believe an ultrasound is indicated is not tender right upper quadrant.  He has had previous abdominal surgeries for mesenteric clot. [RZ]   1727 EKG done at 1719 interpreted by me shows normal sinus rhythm at 93 bpm with no obvious ischemia there is some significant background noise.  This is similar to old EKG January 5, 2022.  This is different than the interpretation done by the computer which suggest A-fib.  I believe this is more artifact than true A-fib. [RZ]   1743 We are awaiting CT and labs patient is endorsed oncoming physician Dr. Maciel at 1800 [RZ]   1855 Received a call from radiology, patient with multiple critical findings, portal venous gas, bowel wall edema, bowel distention, hemorrhagic free fluid, and SMA graft occlusion concerning for mesenteric ischemia.  Discussed patient with general surgeon at [facility], Dr. Husain he states this requires vascular surgery and needs to be transferred.   Updated patient.  He is hemodynamically stable, but has significant abdominal tenderness.  Will redose pain medicine.  Was tachycardic arrival here with elevated white blood cell count, will initiate sepsis protocol at this time as this is sepsis time 0. [SH]   1915 Discussed patient with vascular surgery via transfer center.  She will accept the patient directly to the OR at [facility] for emergent surgery.  She requested IV antibiotics which were already initiated.  IV heparin drip.  Will trend his lactate.  She asked that we informed the patient of severe high risk for morbidity mortality from the surgery even if done in a timely fashion.  Patient will be made aware.  EMTALA form completed.  Will have patient sign.  Patient to be transferred via CCT either air if lying or ground if CCTA is not flying. [SH]   1925 Patient discussed with transfer center again, CCT air is not flying, CCT ground will be a significant delay.  ALS is able to be here very momentarily.  I considered the risk first benefit of ALS transfer versus CCT ground transfer and feel that the benefits of more expeditious time to the operating room are outweighed by the risks of a lower level of care during transport given the patient is currently hemodynamically stable.  Informed patient of this.  Patient agrees.  Patient will be transferred directly via ALS at this time. [SH]      ED Course User Index  [RZ] Cipriano Palma MD  [SH] Cheikh Maciel MD         Diagnoses as of 11/28/24 1926   Mesenteric ischemia (Multi)   Sepsis with acute organ dysfunction without septic shock, due to unspecified organism, unspecified organ dysfunction type (Multi)       Disposition   As a result of their workup, the patient will require transfer to another facility.  The patient and/or his guardian/representative is agreeable to transfer at this time.   We will continue to monitor and manage the patient in the Emergency Department until transport  for transfer can be arranged.    Procedures   Critical Care    Performed by: Cheikh Maciel MD  Authorized by: Cheikh Maciel MD    Critical care provider statement:     Critical care time (minutes):  45    Critical care time was exclusive of:  Separately billable procedures and treating other patients and teaching time    Critical care was necessary to treat or prevent imminent or life-threatening deterioration of the following conditions:  Sepsis and circulatory failure    Critical care was time spent personally by me on the following activities:  Development of treatment plan with patient or surrogate, discussions with consultants, evaluation of patient's response to treatment, examination of patient, obtaining history from patient or surrogate, ordering and performing treatments and interventions, ordering and review of laboratory studies, ordering and review of radiographic studies, pulse oximetry, re-evaluation of patient's condition and review of old charts    Care discussed with: accepting provider at another facility            Cheikh Maciel MD  Emergency Medicine

## 2024-11-29 NOTE — H&P
[facility]  ACUTE CARE SURGERY - HISTORY AND PHYSICAL / CONSULT    Patient Name: Andrea Abdi  MRN: [id number]  Admit Date:   : 1955  AGE: 69 y.o.   GENDER: male  ==============================================================================  TODAY'S ASSESSMENT AND PLAN OF CARE:  69 year old male with tobacco use disorder, heavy alcohol use, mesenteric ischemia status post mesenteric artery bypass with stent, hypertension who was transferred to [facility] from [facility] for CT findings of occlusion of proximal SMA and occluded bypass stent graft with portal venous gas and pneumatosis in the setting of a 1 day history of severe abdominal pain. Lactate peaked at 2.6 and is now 1.6. WBC 19.4. Patient brought directly to OR upon arrival.     Plan:   - Emergent OR with vascular surgery  - Informed consent obtained  - TICU post-operatively    Seen with Dr. Misha Villalobos     ==============================================================================  CHIEF COMPLAINT/REASON FOR CONSULT:  Mesenteric ischemia     PAST MEDICAL HISTORY:   PMH:   HTN  Tobacco use  ETOH use  Mesenteric ischemia     PSH:   Mesenteric bypass with stenting     FH:   No family history on file.  SOCIAL HISTORY:    Smokin/2 PPD  Social History     Tobacco Use   Smoking Status Every Day    Types: Cigarettes   Smokeless Tobacco Never       Alcohol:   Social History     Substance and Sexual Activity   Alcohol Use Yes       Drug use: None    MEDICATIONS:   Prior to Admission medications    Medication Sig Start Date End Date Taking? Authorizing Provider   levETIRAcetam (Keppra) 500 mg tablet TAKE 1 TABLET BY MOUTH TWICE DAILY AS DIRECTED 24   Antonio Yi MD     ALLERGIES:   No Known Allergies    REVIEW OF SYSTEMS:  Review of Systems   All other systems reviewed and are negative.    PHYSICAL EXAM:  Physical Exam  Constitutional:       Appearance: He is not toxic-appearing.   HENT:      Mouth/Throat:       Mouth: Mucous membranes are dry.   Cardiovascular:      Rate and Rhythm: Normal rate.   Abdominal:      Comments: Diffusely tender   Musculoskeletal:         General: Normal range of motion.   Skin:     General: Skin is warm and dry.   Neurological:      General: No focal deficit present.      Mental Status: He is alert.       IMAGING SUMMARY:  (summary of findings, not a copy of dictation)  1.  Findings highly concerning for acute bowel ischemia. Multiple  dilated loops of small bowel in the left upper quadrant and left  lower quadrant with areas of pneumatosis noted, likely due to  ischemic ileus. No clear evidence of bowel obstruction. Small amount  of hemorrhagic fluid in the left upper paracolic gutter posterior to  these abnormal loops of small bowel. Focal mesenteric venous gas in  the left upper quadrant adjacent to this abnormal small bowel, for  example coronal image 63 and axial image 210. Additionally diffuse  liver portal venous gas.  2. Age indeterminate occlusion of the proximal superior mesenteric  artery. There is also occluded bypass stent graft from left common  iliac to the proximal superior mesenteric artery which may represent  acute thrombus. Additionally age-indeterminate bilateral superficial  femoral artery occlusions from the origin. General and vascular  surgery  consultation is recommended.    LABS:  Results from last 7 days   Lab Units 11/28/24  1722   WBC AUTO x10*3/uL 19.4*   HEMOGLOBIN g/dL 14.7   HEMATOCRIT % 41.9   PLATELETS AUTO x10*3/uL 237   NEUTROS PCT AUTO % 87.2   LYMPHS PCT AUTO % 2.7   MONOS PCT AUTO % 8.7   EOS PCT AUTO % 0.6         Results from last 7 days   Lab Units 11/28/24  1722   SODIUM mmol/L 140   POTASSIUM mmol/L 3.7   CHLORIDE mmol/L 95*   CO2 mmol/L 31   BUN mg/dL 17   CREATININE mg/dL 0.93   CALCIUM mg/dL 10.2   PROTEIN TOTAL g/dL 8.4*   BILIRUBIN TOTAL mg/dL 1.4*   ALK PHOS U/L 53   ALT U/L 24   AST U/L 46*   GLUCOSE mg/dL 156*     Results from last 7  days   Lab Units 11/28/24  1722   BILIRUBIN TOTAL mg/dL 1.4*             I have reviewed all laboratory and imaging results ordered/pertinent for this encounter.    I saw and evaluated the patient. I personally obtained the key and critical portions of the history and physical exam. I reviewed the resident’s documentation and discussed the patient with the resident. I agree with the resident’s medical decision making as documented in the resident’s note.    69M with h/o EtOH use, tobacco use, mesenteric ischemia with multiple vascular interventions including retrograde left iliac to SMA bypass (2020) who presented to [facility] ED this afternoon with one day of abdominal pain and was transferred with concern for acute mesenteric ischemia, on a heparin drip.     On my review of the CT a/p, both the SMA and the retrograde bypass to the SMA are occluded. There is dilated and thickened small bowel with pneumatosis and portal venous gas. There is a small amount of free fluid but no free air. Labs reviewed and notable for wbc 19, cr 0.93, lactate 2.6 -> 1.6.     CT findings are highly concerning for small bowel ischemia. In discussion with the vascular surgery team we will plan for emergent exploratory laparotomy. I spoke with the patient pre-operatively. I explained that if there is a focal segment of intestine affected then we may be able to resect that. I explained that he may need multiple bowel resections, an period with a temporary abdominal closure, and ultimately a stoma. I also explained that it is possible that we might find such extensive bowel ischemia that there are no surgeries that we could do to fix the problem. The patient understands the gravity of the situation. He understands the risks and benefits and would like to proceed with surgery.    Arcadio Cabral MD  Trauma, Critical Care, and Acute Care Surgery  Pager: [phone]

## 2024-11-29 NOTE — SIGNIFICANT EVENT
"   11/29/24 0515   Provider Notification   Reason for Communication Medication concern   Provider Name Jeb Mosqueda   Provider Role Resident   Method of Communication Call   Details of Communication Pt family requesting med changed to dilaudid for pain.   Response No new orders   Notification Time 0517     Pt states \" My pain isn't too bad, It is greater than a 5. I feel like I have to pee bad\"  Pt assisted up,sat onto side of bed for a few minutes. Also stood up with RN for few minutes, also took several steps to left before returning to bed. Pt did not request dilaudid his family feels he needs it. Pt was encouraged per nurse to verbalize his concerns and needs. Pt states\"I just want to talk to the doctors about how much time I have left.\" Pt family states \" He looks like he is in pain and we can't talk with him he looks sleepy but in pain. Writer did inform pt and family that his mediation for pain would be reviewed by the doctors and any changes made I would keep them informed. Also they will be rounding this morning.  "

## 2024-11-29 NOTE — PROGRESS NOTES
Hospice consult placed. LSW spoke with wife who reports comfort care/hospice was not discussed with her prior to now. Wife reports patient is not doing good and  just walked in, call was abruptly ended. LSW notified TCC.     SHAMIKA Lawson

## 2024-11-29 NOTE — SIGNIFICANT EVENT
Brief ACS Note    69M with h/o EtOH use, tobacco use, mesenteric ischemia with multiple vascular interventions including retrograde left iliac to SMA bypass (2020) who presented to [facility] ED this afternoon with one day of abdominal pain and was transferred with concern for acute mesenteric ischemia, on a heparin drip.    On my review of the CT a/p, both the SMA and the retrograde bypass to the SMA are occluded. There is dilated and thickened small bowel with pneumatosis and portal venous gas. There is a small amount of free fluid but no free air. Labs reviewed and notable for wbc 19, cr 0.93, lactate 2.6 -> 1.6.    CT findings are highly concerning for small bowel ischemia. In discussion with the vascular surgery team we will plan for emergent exploratory laparotomy. I spoke with the patient pre-operatively. I explained that if there is a focal segment of intestine affected then we may be able to resect that. I explained that he may need multiple bowel resections, an period with a temporary abdominal closure, and ultimately a stoma. I also explained that it is possible that we might find such extensive bowel ischemia that there are no surgeries that we could do to fix the problem. The patient understands the gravity of the situation. He understands the risks and benefits and would like to proceed with surgery.    Arcadio Cabral MD  Trauma, Critical Care, and Acute Care Surgery  Pager: [phone]

## 2024-11-29 NOTE — CARE PLAN
"The patient's goals for the shift include      The clinical goals for the shift include      Problem: Communication Deficit  Goal: Patient/caregiver/family will effectively communicate symptoms, needs and concerns  Outcome: Progressing   MD has discussed prognosis and pt to be comfort care only.  Problem: Imminent death  Goal: Collaborate with patient, family, caregiver and interdisciplinary team to minimize end of life symptoms  Outcome: Progressing  has been paged. [facility] responded and offered services. Family decline Jamel's services and state \" We will wait until the  comes in the morning.\"     "

## 2024-11-29 NOTE — H&P
VASCULAR SURGERY HISTORY AND PHYSICAL  HPI:  Andrea Abdi is a 69 y.o. male who presented to the ER with 20 hours of abdominal pain, nausea, vomiting. Patient has a history of iliomesenteric bypass for acute on chronic mesenteric ischemia, prior to that bypass he had a perc thrombectomy of the SMA in 2015. CTA obtained in OSH ER demonstrates occlusion of bypass graft, with pneumatosis intestinalis and portal venous gas, consistent with acute mesenteric ischemia and likely bowel infarction. White count 19 at OSH. Patient reports his abdominal pain is the same as when his SMA previously thrombosed before his bypass.     PMH:  CMI, tobacco use, HTN, prior alcohol abuse    PSH:   Iliomesenteric bypass, percutaneous mechanical thrombectomy of SMA 2015    Objective   Heart Rate:  []   Temp:  [37 °C (98.6 °F)]   Resp:  [16-18]   BP: (153-193)/()   Height:  [182.9 cm (6')]   Weight:  [65.8 kg (145 lb)]   SpO2:  [96 %-100 %]     Physical Exam:  General: NAD, AAOx3  Neuro: no gross deficits, speech WNL  HEENT: NC/AT  CV: RRR  Pulm: normal respiratory effort on NC  Abd: soft, involuntary guarding throughout abdomen, abdomen is warm to touch, nondistended  Extr: FROM, no deformities, no swelling  Skin: no lesions or rashes     ROS:  12-point review of systems was performed and is negative except as noted above.     Labs:  Results from last 7 days   Lab Units 11/28/24  1722   WBC AUTO x10*3/uL 19.4*   HEMOGLOBIN g/dL 14.7   HEMATOCRIT % 41.9   PLATELETS AUTO x10*3/uL 237     Results from last 7 days   Lab Units 11/28/24  1722   SODIUM mmol/L 140   POTASSIUM mmol/L 3.7   CHLORIDE mmol/L 95*   CO2 mmol/L 31   BUN mg/dL 17   CREATININE mg/dL 0.93   GLUCOSE mg/dL 156*   CALCIUM mg/dL 10.2               Imaging:  Reviewed independently by vascular team:  CTA abdomen/ pelvis 11/28/2024: thrombosed SMA bypass, diffuse pneumatosis intestinalis, portal venous gas, free fluid in pelvis    Assessment/Plan   69 y.o. male  with thrombosed SMA bypass causing acute mesenteric ischemia. WBC 19. Evidence of bowel infarction on CT.    Plan:  - To OR emergently for exploratory laparotomy, bypass thrombectomy, possible bowel resection  - Discussed with patient that if bowel is diffusely necrotic, that we will close his abdomen and transition him to comfort care in the ICU   - Continue broad spectrum abx    D/w attending, Dr. Dunphy Grant B. Hubbard, MD  Vascular Surgery Fellow  Service Pager: [phone]  Available over Epic Chat

## 2024-11-29 NOTE — PROGRESS NOTES
VASCULAR SURGERY PROGRESS NOTE  Assessment/Plan   Andrea Abdi is 69 y.o. male with acute mesenteric ischemia due to thrombosed SMA bypass graft who is s/p exploratory laparotomy finding diffuse bowel necrosis.    Plan:  Comfort care and hospice  Vascular surgery available as needed    D/w attending, Dr. Dunphy Joel R Hlavaty, MD  Vascular Surgery Fellow  Team Pager [phone]  11/29/24  7:54 AM      Subjective   He is having some pain this morning. Dilaudid PCA has been ordered    Objective   Vitals:  Heart Rate:  []   Temp:  [35.8 °C (96.4 °F)-37 °C (98.6 °F)]   Resp:  [12-30]   BP: (113-193)/()   Height:  [182.9 cm (6')]   Weight:  [65.8 kg (145 lb)]   SpO2:  [91 %-100 %]     Exam:  Constitutional: No acute distress, resting comfortably  Neuro:  AOx3, grossly intact  ENMT: moist mucous membranes  CV: no tachycardia  Pulm: non-labored on nasal cannula  GI: midline incision with appropriate tenderness to palpation  Skin: warm and dry  Musculoskeletal: moving all extremities  Extremities: warm and well perfused     Labs:  Results from last 7 days   Lab Units 11/28/24  1722   WBC AUTO x10*3/uL 19.4*   HEMOGLOBIN g/dL 14.7   PLATELETS AUTO x10*3/uL 237      Results from last 7 days   Lab Units 11/28/24  1722   SODIUM mmol/L 140   POTASSIUM mmol/L 3.7   CHLORIDE mmol/L 95*   CO2 mmol/L 31   BUN mg/dL 17   CREATININE mg/dL 0.93   GLUCOSE mg/dL 156*

## 2024-11-29 NOTE — ANESTHESIA PROCEDURE NOTES
Airway  Date/Time: 11/28/2024 9:07 PM  Urgency: elective    Airway not difficult    Staffing  Performed: resident   Authorized by: Roldan Miller MD    Performed by: Roldan Miller MD  Patient location during procedure: OR    Indications and Patient Condition  Indications for airway management: anesthesia  Spontaneous Ventilation: absent  Sedation level: deep  Preoxygenated: yes  Patient position: sniffing  Mask difficulty assessment: 0 - not attempted  Planned trial extubation    Final Airway Details  Final airway type: endotracheal airway      Successful airway: ETT  Cuffed: yes   Successful intubation technique: direct laryngoscopy  Facilitating devices/methods: intubating stylet  Endotracheal tube insertion site: oral  Blade: Vika  Blade size: #4  ETT size (mm): 7.0  Cormack-Lehane Classification: grade IIb - view of arytenoids or posterior of glottis only  Placement verified by: chest auscultation and capnometry   Measured from: lips  ETT to lips (cm): 22  Number of attempts at approach: 1

## 2024-11-29 NOTE — OP NOTE
Exploratory Laparotomy Operative Note     Date: 2024  OR Location: [facility] OR    Name: Andrea Abdi, : 1955, Age: 69 y.o., MRN: [id number], Sex: male    Diagnosis  Pre-op Diagnosis      * Mesenteric ischemia (Multi) [K55.9] Post-op Diagnosis     * Mesenteric ischemia (Multi) [K55.9]     Procedures  - Exploratory laparotomy    Surgeons   Panel 1:     * Kaitlyn M Dunphy - Primary  Panel 2:     * Arcadio Cabral - Primary    Resident/Fellow/Other Assistant:  Surgeons and Role:  Panel 1:     * Aubrey Tyson MD - Resident - Assisting  Panel 2:     * Ramesh Maria MD - Resident - Assisting     * Gayatri Villalobos MD - Resident - Assisting    Staff:   Circulator: Savanna Corrales Person: Fabricio    Anesthesia Staff: Anesthesiologist: Fabricio Ureña MD  Anesthesia Resident: Roldan Miller MD    Procedure Summary  Anesthesia: Anesthesia type not filed in the log.  ASA: ASA status not filed in the log.  Estimated Blood Loss: 5mL  Intra-op Medications:   Administrations occurring from 24 1655 to 24 0300:   Medication Name Total Dose   fentaNYL PF 0.05 mg/mL 100 mcg   lidocaine (Xylocaine) injection 1 % 100 mg   midazolam PF (Versed) injection 1 mg/mL 2 mg   ondansetron 2 mg/mL 4 mg   propofol (Diprivan) infusion 10 mg/mL 80 mg   rocuronium (ZeMuron) 50 mg/5 mL injection 100 mg              Anesthesia Record               Intraprocedure I/O Totals          Intake    Propofol Drip 0.00 mL    The total shown is the total volume documented since Anesthesia Start was filed.    Total Intake 0 mL          Specimen: No specimens collected        Drains and/or Catheters:   Urethral Catheter Non-latex 16 Fr. (Active)     Findings: Extensive bowel ischemia which is non-survivable. Proximal 30 cm of jejunum, hepatic flexure and colon and transverse colon congested but perfused. The remainder of the small and large bowel were ischemic with areas of claudette necrosis.    Indications: Andrea Abdi is an 69  y.o. male h/o EtOH use, tobacco use, mesenteric ischemia with multiple vascular interventions including retrograde left iliac to SMA bypass (2020) who presented to [facility] ED this afternoon with one day of abdominal pain and was transferred with concern for acute mesenteric ischemia. Plan for emergent laparotomy.    The patient was seen in the preoperative area. The risks, benefits, complications, treatment options, non-operative alternatives, expected recovery and outcomes were discussed with the patient. The possibilities of reaction to medication, pulmonary aspiration, injury to surrounding structures, bleeding, recurrent infection, the need for additional procedures, failure to diagnose a condition, and creating a complication requiring transfusion or operation were discussed with the patient. The patient concurred with the proposed plan, giving informed consent.  The site of surgery was properly noted/marked if necessary per policy. The patient has been actively warmed in preoperative area. Preoperative antibiotics have been ordered and given within 1 hours of incision. Venous thrombosis prophylaxis have been ordered including bilateral sequential compression devices    Procedure Details: Following informed consent the patient was taken to the operating room and placed in the supine position.  General endotracheal anesthesia was induced, perioperative antibiotics were given, and the abdomen and groins were prepped and draped in a sterile fashion.  The patient had a well-healed midline scar.  A midline laparotomy incision was made over the existing scar, and the abdomen was entered sharply.  There were a small amount of adhesions to the midline which were lysed.  There was small amount of turbid fluid in the pelvis.  The bowel was run from ligament of Treitz to peritoneal reflection of rectum.  The stomach, and proximal duodenum appeared healthy.  The proximal 30 cm of jejunum appeared congested perfused.  However  the entire remainder of the small bowel, cecum, and right colon were frankly ischemic, with areas of necrosis.  The hepatic flexure transverse colon and splenic flexure appeared congested and patchy, but perfused.  There was patchy ischemia of the descending and sigmoid colon and rectum.  Dr. Dunphy (vascular surgery) was scrubbed, and we agreed that unfortunately this degree of bowel ischemia was nonsurvivable and there would not be any therapeutic value in bowel resection. The midline fascia was closed with a running #1 PDS and the skin was closed with staples.     Complications:   Extensive bowel ischemia, non-survivable.     Disposition: PACU - guarded condition.  Condition: stable     Attending Attestation: I was present and scrubbed for the key portions of the procedure.    Arcadio Cabral MD  Trauma, Critical Care, and Acute Care Surgery  Pager: [phone]

## 2024-11-29 NOTE — ANESTHESIA POSTPROCEDURE EVALUATION
Patient: Andrea Abdi    Procedure Summary       Date: 11/28/24 Room / Location: [facility] OR 27 / [facility]    Anesthesia Start: 2056 Anesthesia Stop: 2227    Procedures:       Exploration Laparotomy (Abdomen)      Exploration Laparotomy (Abdomen) Diagnosis:       Mesenteric ischemia (Multi)      (Mesenteric ischemia (Multi) [K55.9])    Surgeons: Kaitlyn M Dunphy, MD; Arcadio Cabral MD Responsible Provider: Fabricio Ureña MD    Anesthesia Type: general ASA Status: 4 - Emergent            Anesthesia Type: general    Vitals Value Taken Time   /92 11/28/24 2227   Temp 37.8 11/28/24 2227   Pulse 87 11/28/24 2225   Resp 28 11/28/24 2225   SpO2 96 % 11/28/24 2225   Vitals shown include unfiled device data.    Anesthesia Post Evaluation    Patient location during evaluation: PACU  Patient participation: complete - patient participated  Level of consciousness: awake and alert  Pain score: 3  Pain management: adequate  Airway patency: patent  Cardiovascular status: acceptable, hemodynamically stable and hypertensive  Respiratory status: acceptable and face mask  Hydration status: acceptable  Postoperative Nausea and Vomiting: none        No notable events documented.

## 2024-11-29 NOTE — PROGRESS NOTES
Pt s/p exlap with general surgery. Pt with diffusely necrotic small bowel starting 30 cm distal to LOT which extends to the sigmoid colon. This is not survivable. Abdomen explored and closed by general surgery, please see their operative notes. Pt to be extubated. Family was made aware of the clinical situation and knows that patient will pass from this condition.    Kaitlyn Dunphy, MD  [id number]

## 2024-11-29 NOTE — PROGRESS NOTES
Pt seen and examined. He is a 68 yo male with history of mesenteric ischemia s/p L GINO to distal SMA bypass with 6 mm PTFE in 2020 at [facility]. He stated he started having abdominal pain last night around 10 pm and then presented to [facility] this afternoon. CTA noted occluded SMA bypass with bowel pneumatosis and portal venous gas. Pt also with WBC 19. Pt transferred to [facility].    I spoke to patient in person and wife over the phone. I explained that he is at very high risk of death due to this condition. I told them that even with surgical intervention he may not survive this hospital stay. I explained that if he does not have any normal bowel on exploration, we will not proceed with any further management. Pt and wife aware that he is at high risk of death from this condition.     I explained that if we do see bowel that could potentially  be salvaged, we would try to remove clot to improve perfusion to his bowel, however, I am unsure if this will be successful.     Pt and wife expressed understanding. Pt elects to proceed.

## 2024-11-29 NOTE — SIGNIFICANT EVENT
Brief ACS Update    Met with patient and his wife and family in the PACU. Pt alert and pain controlled. I explained that unfortunately we do not have any medical options to treat his bowel ischemia and he will die in the hospital from this problem. He and his family understand the situation. We will continue comfort focussed care. The patient requested to speak with a , which we will arrange.    Arcadio Cabral MD  Trauma, Critical Care, and Acute Care Surgery  Pager: [phone]

## 2024-11-29 NOTE — PROGRESS NOTES
[facility]  ACUTE CARE SURGERY - PROGRESS NOTE    Patient Name: Andrea Abdi  MRN: [id number]  Admit Date: 1128  : 1955  AGE: 69 y.o.   GENDER: male  ==============================================================================  TODAY'S ASSESSMENT AND PLAN OF CARE:  69yoM with c/f mesenteric ischemia   S/p ex-lap with findings of Mesenteric Ischemia jejunum to rectum    Plan:   -Comfort care  -Jamel  -hospice/palliative consult    Audrey Mack md/mo [email or web address]    Subjective   ==============================================================================  CHIEF COMPLAINT / EVENTS LAST 24HRS / HPI:  Pt mildly delirious     MEDICAL HISTORY / ROS:   Admission history reviewed. Pertinent changes as follows:  None.    A 12-point review of systems was performed, and was negative except as above.     Objective   Vital Signs past 24h:  Heart Rate:  []   Temp:  [35.8 °C (96.4 °F)-37.7 °C (99.9 °F)]   Resp:  [12-30]   BP: (103-193)/()   Height:  [182.9 cm (6')]   Weight:  [65.8 kg (145 lb)]   SpO2:  [87 %-100 %]     I/O past 24h:  I/O last 2 completed shifts:  In: 566.7 [I.V.:566.7]  Out: 695 [Urine:680; Blood:15]     PHYSICAL EXAM:  Gen: in pain in bed  CV: RRR  Pulm: non-labored no NC  ABD: midline incision with appropriate tenderness to palpation    Labs past 24h:  Results for orders placed or performed during the hospital encounter of 11/28/24 (from the past 24 hours)   VERIFY ABO/Rh Group Test   Result Value Ref Range    ABO TYPE A     Rh TYPE POS    ABO/Rh   Result Value Ref Range    ABO TYPE A     Rh TYPE POS        Imaging within past 24h:  CT angio abdomen pelvis w and or wo IV IV contrast    Result Date: 2024  Interpreted By:  Joel Palacios, STUDY: CT ANGIO ABDOMEN PELVIS W AND/OR WO IV IV CONTRAST;  2024 6:28 pm   INDICATION: Signs/Symptoms:abd pain.     COMPARISON: None.   ACCESSION NUMBER(S): [id number]   ORDERING CLINICIAN: RUPA MCCANN    TECHNIQUE: CT of the abdomen and pelvis was obtained before and after administration of intravenous contrast in the arterial and delayed venous phase as part of CT angiography protocol. Coronal sagittal reformats were obtained. 3D volume rendering of the aorta and its branch vessels was obtained on a separate workstation. 90 ML of Omnipaque 350 was administered intravenously without immediate complication.   FINDINGS: LOWER CHEST: There are peribronchial ground-glass opacities in the right middle lobe suggestive of mucous plugging or infectious/inflammatory bronchiolitis. Lung bases are otherwise clear.   ABDOMEN:   LIVER: Normal size and enhancement.   BILE DUCTS: No biliary dilatation.   GALLBLADDER: No radiopaque calculi. No gallbladder wall thickening or distention.   PANCREAS: Normal size and enhancement. No peripancreatic edema.   SPLEEN: Normal.   ADRENAL GLANDS: Normal.   KIDNEYS AND URETERS: Normal size and enhancement of the kidneys. No hydronephrosis or nephrolithiasis.   PELVIS:   BLADDER: Grossly normal.   REPRODUCTIVE ORGANS: No pelvic mass visualized.   BOWEL: There are multiple dilated loops of small bowel without a clear transition point. There is pneumatosis within the small bowel in the left upper quadrant. There is also mesenteric venous gas adjacent to the small bowel, for example axial image 215 of 455.   VESSELS: Abdominal aorta is normal in size and patent without dissection. Moderate circumferential atherosclerotic plaque of the abdominal aorta. Celiac artery is patent. There is chronic appearing occlusion of the proximal superior mesenteric artery. There is a stent graft from the left common iliac artery to the proximal superior mesenteric artery which is also occluded. This is age-indeterminate. Inferior mesenteric artery is diminutive but patent. Mild atherosclerotic narrowing of the bilateral renal artery origins which are patent. Common iliac, external iliac, and common femoral arteries  are widely patent. There is age-indeterminate occlusion of the bilateral superficial femoral arteries from the origin.   Portal, splenic, and superior mesenteric veins are patent.   PERITONEUM/RETROPERITONEUM/LYMPH NODES: No free air. There is however portal venous gas. There is also small amount of hemorrhagic fluid in the left upper quadrant posterior to the abnormal small bowel, with increased density of the hemorrhagic fluid on the venous phase, for example axial image 141 of 455.. There is otherwise trace amount of ascitic fluid in the right upper paracolic gutter.   BONES AND ABDOMINAL WALL: Vertebral bodies are intact. No focal suspicious lesions. Osteopenia.       1.  Findings highly concerning for acute bowel ischemia. Multiple dilated loops of small bowel in the left upper quadrant and left lower quadrant with areas of pneumatosis noted, likely due to ischemic ileus. No clear evidence of bowel obstruction. Small amount of hemorrhagic fluid in the left upper paracolic gutter posterior to these abnormal loops of small bowel. Focal mesenteric venous gas in the left upper quadrant adjacent to this abnormal small bowel, for example coronal image 63 and axial image 210. Additionally diffuse liver portal venous gas. 2. Age indeterminate occlusion of the proximal superior mesenteric artery. There is also occluded bypass stent graft from left common iliac to the proximal superior mesenteric artery which may represent acute thrombus. Additionally age-indeterminate bilateral superficial femoral artery occlusions from the origin. General and vascular surgery  consultation is recommended.   Joel Palacios discussed the significance and urgency of this critical finding by telephone with  Dr Maciel on 11/28/2024 at 6:49 pm. (**-RCF-**) Findings:  See findings.   MACRO: None   Signed by: Joel Palacios 11/28/2024 6:58 PM Dictation workstation:   [id number]     I have reviewed the imaging above as it pertains to the  patient's surgical concerns and agree with the radiologist's interpretation.     Patient's exam, labs, and findings discussed with Dr. Verdin, who agrees with the plan as described above.    Audrey Mack MD  PGY-1 General Surgery  Acute Care Surgery [id number]

## 2024-11-30 NOTE — HOSPITAL COURSE
69 year old male with a history of tobacco use disorder, heavy alcohol use, mesenteric ischemia status post mesenteric artery bypass with stent, hypertension who was transferred to [facility] from [facility] for CT findings of occlusion of proximal SMA and occluded bypass stent graft with portal venous gas and pneumatosis in the setting of a 1 day history of severe abdominal pain. CTA obtained in OSH ER demonstrates occlusion of bypass graft, with pneumatosis intestinalis and portal venous gas, consistent with acute mesenteric ischemia and likely bowel infarction. Patient was brought immediately to the OR with acute care surgery and vascular surgery. Intraoperative findings showed extensive bowel ischemia which is non-survivable. Proximal 30 cm of jejunum, hepatic flexure and colon and transverse colon congested but perfused. The remainder of the small and large bowel were ischemic with areas of claudette necrosis. Patient's condition was deemed not survivable. Goals of care discussion was done with patient's wife and family in PACU. Family members were understanding of the situation and requested comfort care and speaking to a , which was arranged. Palliative Medicine and Hospice Care were consulted to provide support to patient and family during this hospitalization. On 11/28/2024, Mr. Abdi was made comfort care and all life-sustaining measures were discontinued. Comfort care with pain control, anxiety relief were continued.     On 11/30, ACS team met with Hospice Care to complete discharge to [facility].

## 2024-11-30 NOTE — SIGNIFICANT EVENT
Called to see patient for unresponsiveness.    On exam the patient did not respond to verbal or physical stimuli.     Absent heart and breath sounds .   Absent peripheral pulses.   Pupils are fixed and dilated.   No cranial reflexes observed.    Patient was pronounced dead at 12:23 PM on 11/30/24.    Dr. Verdin notified.     Next of kin/family was at bedside and notified.    Nancy Mason MD  General Surgery Resident  PGY-2  ACS [id number]

## 2024-11-30 NOTE — H&P
[facility]  ACUTE CARE SURGERY - HISTORY AND PHYSICAL / CONSULT    Patient Name: Andrea Abdi  MRN: [id number]  Admit Date: 1128  : 1955  AGE: 69 y.o.   GENDER: male  ==============================================================================  TODAY'S ASSESSMENT AND PLAN OF CARE:  69 year old male with a history of tobacco use disorder, heavy alcohol use, mesenteric ischemia status post mesenteric artery bypass with stent, hypertension who was [place] [facility] for CT findings of occlusion of proximal SMA and occluded bypass stent graft with portal venous gas and pneumatosis in the setting of a 1 day history of severe abdominal pain. CTA obtained in OSH ER demonstrates occlusion of bypass graft, with pneumatosis intestinalis and portal venous gas, consistent with acute mesenteric ischemia and likely bowel infarction. Patient was brought immediately to the OR with acute care surgery and vascular surgery. Intraoperative findings showed extensive bowel ischemia which is non-survivable. Proximal 30 cm of jejunum, hepatic flexure and colon and transverse colon congested but perfused. The remainder of the small and large bowel were ischemic with areas of claudette necrosis. Patient's condition was deemed not survivable. Goals of care discussion was done with patient's wife and family in PACU. Family members were understanding of the situation and requested comfort care and speaking to a , which was arranged. Palliative Medicine and Hospice Care were consulted to provide support to patient and family during this hospitalization. On 2024, Mr. Abdi was made comfort care and all life-sustaining measures were discontinued. Comfort care with pain control, anxiety relief were continued.      On , ACS team met with Hospice Care to complete discharge to [facility].     Plan:  - Discharged from hospital/medical facility   - Re-admitted  for Inpatient Hospice 11/30  - Continue comfort measures and supportive care  - [facility] palliative medicine for assistance    Patient discussed with attending Dr. Velvet Mason MD  General Surgery Resident  PGY-2  ACS [id number]  ==============================================================================  CHIEF COMPLAINT/REASON FOR CONSULT:  Mesenteric Ischemia    PAST MEDICAL HISTORY:   PMH:   No past medical history on file.    PSH:   No past surgical history on file.    FH:   No family history on file.  SOCIAL HISTORY:    Smoking:   Social History     Tobacco Use   Smoking Status Every Day    Types: Cigarettes   Smokeless Tobacco Never       Alcohol:    Social History     Substance and Sexual Activity   Alcohol Use Yes       Drug use: none    MEDICATIONS:   Prior to Admission medications    Medication Sig Start Date End Date Taking? Authorizing Provider   levETIRAcetam (Keppra) 500 mg tablet TAKE 1 TABLET BY MOUTH TWICE DAILY AS DIRECTED 7/9/24   Antonio Yi MD     ALLERGIES:   No Known Allergies    Review of Systems  ROS: 12-point review of system performed and is negative except as stated in HPI.    Physical Exam  Physical Exam  General: resting comfortably in bed, no acute distress  HEENT: atraumatic  CV: hypotensive, sinus tachycardia  Pulm: non-labored breathing on room air, spO2 88%  GI: midline incision closed with staples, well-approximated  Extremities: no peripheral edema    IMAGING SUMMARY:  (summary of findings, not a copy of dictation)  none    LABS:  Results from last 7 days   Lab Units 11/28/24  1722   WBC AUTO x10*3/uL 19.4*   HEMOGLOBIN g/dL 14.7   HEMATOCRIT % 41.9   PLATELETS AUTO x10*3/uL 237   NEUTROS PCT AUTO % 87.2   LYMPHS PCT AUTO % 2.7   MONOS PCT AUTO % 8.7   EOS PCT AUTO % 0.6         Results from last 7 days   Lab Units 11/28/24  1722   SODIUM mmol/L 140   POTASSIUM mmol/L 3.7   CHLORIDE mmol/L 95*   CO2 mmol/L 31   BUN mg/dL 17   CREATININE mg/dL 0.93   CALCIUM  mg/dL 10.2   PROTEIN TOTAL g/dL 8.4*   BILIRUBIN TOTAL mg/dL 1.4*   ALK PHOS U/L 53   ALT U/L 24   AST U/L 46*   GLUCOSE mg/dL 156*     Results from last 7 days   Lab Units 11/28/24  1722   BILIRUBIN TOTAL mg/dL 1.4*             I have reviewed all laboratory and imaging results ordered/pertinent for this encounter.

## 2024-11-30 NOTE — CARE PLAN
The patient's goals for the shift include      The clinical goals for the shift include pt will comfortable and pain free      Problem: Communication Deficit  Goal: Patient/caregiver/family will effectively communicate symptoms, needs and concerns  Outcome: Progressing     Problem: Imminent death  Goal: Collaborate with patient, family, caregiver and interdisciplinary team to minimize end of life symptoms  Outcome: Progressing

## 2024-11-30 NOTE — CARE PLAN
Problem: Skin  Goal: Decreased wound size/increased tissue granulation at next dressing change  Outcome: Progressing     Problem: Skin  Goal: Participates in plan/prevention/treatment measures  Outcome: Progressing     Problem: Skin  Goal: Participates in plan/prevention/treatment measures  Outcome: Progressing     Problem: Skin  Goal: Prevent/manage excess moisture  Outcome: Progressing     Problem: Skin  Goal: Prevent/minimize sheer/friction injuries  Outcome: Progressing     Problem: Skin  Goal: Promote/optimize nutrition  Outcome: Progressing     Problem: Skin  Goal: Promote skin healing  Outcome: Progressing

## 2024-11-30 NOTE — DISCHARGE SUMMARY
Discharge Diagnosis  Acute Mesenteric ischemia (Multi)    Issues Requiring Follow-Up  Hospice Care    Test Results Pending At Discharge  Pending Labs       No current pending labs.          Hospital Course   69 year old male with a history of tobacco use disorder, heavy alcohol use, mesenteric ischemia status post mesenteric artery bypass with stent, hypertension [place] [facility] [place] [facility] [place] [facility] findings of occlusion of proximal SMA and occluded bypass stent graft with portal venous gas and pneumatosis in the setting of a 1 day history of severe abdominal pain. CTA obtained in OSH ER demonstrates occlusion of bypass graft, with pneumatosis intestinalis and portal venous gas, consistent with acute mesenteric ischemia and likely bowel infarction. Patient was brought immediately to the OR with acute care surgery and vascular surgery. Intraoperative findings showed extensive bowel ischemia which is non-survivable. Proximal 30 cm of jejunum, hepatic flexure and colon and transverse colon congested but perfused. The remainder of the small and large bowel were ischemic with areas of claudette necrosis. Patient's condition was deemed not survivable. Goals of care discussion was done with patient's wife and family in PACU. Family members were understanding of the situation and requested comfort care and speaking to a , which was arranged. Palliative Medicine and Hospice Care were consulted to provide support to patient and family during this hospitalization. On 11/28/2024, Mr. Abdi was made comfort care and all life-sustaining measures were discontinued. Comfort care with pain control, anxiety relief were continued.      On 11/30, ACS team met with Hospice Care to complete discharge to [facility].     Called to see patient for unresponsiveness and lack of vital signs at 12:20pm 11/30.     On exam the patient did not respond to verbal or physical stimuli.      Absent heart and breath sounds .    Absent peripheral pulses.   Pupils are fixed and dilated.   No cranial reflexes observed.     Time of death: 12:23 PM 11/30/24.    Pertinent Physical Exam At Time of Discharge  Physical Exam  Neuro: absent cranial reflexes, pupils fixed, dilated, non-reactive bilaterally  CV: asystole, absent heart sounds, absent radial and carotid pulses  Pulm: absent breath sounds    Home Medications     Medication List      ASK your doctor about these medications     levETIRAcetam 500 mg tablet; Commonly known as: Keppra; TAKE 1 TABLET BY   MOUTH TWICE DAILY AS DIRECTED       Outpatient Follow-Up  N/A

## 2024-11-30 NOTE — NURSING NOTE
[facility]: I met with pt's spouse Karishma, sister swetha, nephew Marcus. Hospice services reviewed. Spouse in agreement with involving hospice and consents were obtained. Collaborated with Dr Nancy Mason and GIP orders completed. Pt has dilaudid running at 0.4mg/hr and has prn doses of ativan, dilaudid and robinul ordered. Family reluctant to discuss potential DC as surgical team told them pt will die in hospital. Can be readdressed if pt prognosis lengthens. Marisol ESPARZA and Maddie PITTS aware of [facility] involvement. Please call [phone] for any pt or family needs. Thank you for this referral.

## 2024-11-30 NOTE — DISCHARGE SUMMARY
Discharge Diagnosis  Acute Mesenteric ischemia (Multi)    Issues Requiring Follow-Up  Hospice Care    Test Results Pending At Discharge  Pending Labs       No current pending labs.          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 CTA obtained in OSH ER demonstrates occlusion of bypass graft, with pneumatosis intestinalis and portal venous gas, consistent with acute mesenteric ischemia and likely bowel infarction. Patient was brought immediately to the OR with acute care surgery and vascular surgery. Intraoperative findings showed extensive bowel ischemia which is non-survivable. Proximal 30 cm of jejunum, hepatic flexure and colon and transverse colon congested but perfused. The remainder of the small and large bowel were ischemic with areas of claudette necrosis. Patient's condition was deemed not survivable. Goals of care discussion was done with patient's wife and family in PACU. Family members were understanding of the situation and requested comfort care and speaking to a , which was arranged. Palliative Medicine and Hospice Care were consulted to provide support to patient and family during this hospitalization. On 11/28/2024, Mr. Abdi was made comfort care and all life-sustaining measures were discontinued. Comfort care with pain control, anxiety relief were continued.     On 11/30, ACS team met with Hospice Care to complete discharge to [facility].     Pertinent Physical Exam At Time of Discharge  Physical Exam  General: resting comfortably in bed, no acute distress  HEENT: atraumatic  CV: hypotensive, sinus tachycardia  Pulm: non-labored  breathing on room air, spO2 88%  GI: midline incision closed with staples, well-approximated  Extremities: no peripheral edema    Home Medications     Medication List      ASK your doctor about these medications     levETIRAcetam 500 mg tablet; Commonly known as: Keppra; TAKE 1 TABLET BY   MOUTH TWICE DAILY AS DIRECTED       Outpatient Follow-Up  Future Appointments   Date Time Provider Department Center   5/27/2025 10:30 AM Antonio Yi MD [id number]       Nancy Mason MD

## 2024-11-30 NOTE — CARE PLAN
The patient's goals for the shift include      The clinical goals for the shift include pt will remain comfortable and decrease pain throughout this shift      Problem: Pain - Adult  Goal: Verbalizes/displays adequate comfort level or baseline comfort level  Outcome: Progressing     Problem: Safety - Adult  Goal: Free from fall injury  Outcome: Progressing     Problem: Discharge Planning  Goal: Discharge to home or other facility with appropriate resources  Outcome: Progressing     Problem: Chronic Conditions and Co-morbidities  Goal: Patient's chronic conditions and co-morbidity symptoms are monitored and maintained or improved  Outcome: Progressing     Problem: Skin  Goal: Decreased wound size/increased tissue granulation at next dressing change  Outcome: Progressing  Flowsheets (Taken 11/30/2024 1031)  Decreased wound size/increased tissue granulation at next dressing change:   Promote sleep for wound healing   Protective dressings over bony prominences  Goal: Participates in plan/prevention/treatment measures  Outcome: Progressing  Flowsheets (Taken 11/30/2024 1031)  Participates in plan/prevention/treatment measures:   Elevate heels   Discuss with provider PT/OT consult  Goal: Prevent/manage excess moisture  Outcome: Progressing  Flowsheets (Taken 11/30/2024 1031)  Prevent/manage excess moisture:   Cleanse incontinence/protect with barrier cream   Monitor for/manage infection if present   Moisturize dry skin  Goal: Prevent/minimize sheer/friction injuries  Outcome: Progressing  Flowsheets (Taken 11/30/2024 1031)  Prevent/minimize sheer/friction injuries:   HOB 30 degrees or less   Turn/reposition every 2 hours/use positioning/transfer devices   Complete micro-shifts as needed if patient unable. Adjust patient position to relieve pressure points, not a full turn  Goal: Promote/optimize nutrition  Outcome: Progressing  Flowsheets (Taken 11/30/2024 1031)  Promote/optimize nutrition:   Assist with feeding    Monitor/record intake including meals  Goal: Promote skin healing  Outcome: Progressing  Flowsheets (Taken 11/30/2024 1031)  Promote skin healing:   Assess skin/pad under line(s)/device(s)   Protective dressings over bony prominences   Turn/reposition every 2 hours/use positioning/transfer devices     Problem: Fall/Injury  Goal: Not fall by end of shift  Outcome: Progressing  Goal: Be free from injury by end of the shift  Outcome: Progressing  Goal: Verbalize understanding of personal risk factors for fall in the hospital  Outcome: Progressing  Goal: Verbalize understanding of risk factor reduction measures to prevent injury from fall in the home  Outcome: Progressing  Goal: Use assistive devices by end of the shift  Outcome: Progressing  Goal: Pace activities to prevent fatigue by end of the shift  Outcome: Progressing     Problem: Pain  Goal: Takes deep breaths with improved pain control throughout the shift  Outcome: Progressing  Goal: Turns in bed with improved pain control throughout the shift  Outcome: Progressing  Goal: Walks with improved pain control throughout the shift  Outcome: Progressing  Goal: Performs ADL's with improved pain control throughout shift  Outcome: Progressing  Goal: Participates in PT with improved pain control throughout the shift  Outcome: Progressing  Goal: Free from opioid side effects throughout the shift  Outcome: Progressing  Goal: Free from acute confusion related to pain meds throughout the shift  Outcome: Progressing

## 2024-12-03 LAB
BACTERIA BLD CULT: NORMAL
BACTERIA BLD CULT: NORMAL

## 2024-12-08 LAB
Q ONSET: 223 MS
QRS COUNT: 16 BEATS
QRS DURATION: 94 MS
QT INTERVAL: 470 MS
QTC CALCULATION(BAZETT): 584 MS
QTC FREDERICIA: 544 MS
R AXIS: 42 DEGREES
T AXIS: 48 DEGREES
T OFFSET: 458 MS
VENTRICULAR RATE: 93 BPM

## 2025-05-27 ENCOUNTER — APPOINTMENT (OUTPATIENT)
Dept: NEUROLOGY | Facility: CLINIC | Age: 70
End: 2025-05-27
Payer: MEDICARE

## (undated) DEVICE — MANIFOLD, 4 PORT NEPTUNE STANDARD

## (undated) DEVICE — DRAPE, INCISE, ANTIMICROBIAL, IOBAN 2, LARGE, 17 X 23 IN, DISPOSABLE, STERILE

## (undated) DEVICE — Device

## (undated) DEVICE — DRAPE, INCISE, ANTIMICROBIAL, IOBAN 2, STERI DRAPE, 23 X 33 IN, DISPOSABLE, STERILE

## (undated) DEVICE — DRAPE, FLUID WARMER

## (undated) DEVICE — CATHETER TRAY, SURESTEP, 16FR, URINE METER W/STATLOCK